# Patient Record
Sex: FEMALE | Race: WHITE | NOT HISPANIC OR LATINO
[De-identification: names, ages, dates, MRNs, and addresses within clinical notes are randomized per-mention and may not be internally consistent; named-entity substitution may affect disease eponyms.]

---

## 2017-01-12 ENCOUNTER — APPOINTMENT (OUTPATIENT)
Dept: CARDIOLOGY | Facility: CLINIC | Age: 57
End: 2017-01-12

## 2017-01-12 ENCOUNTER — NON-APPOINTMENT (OUTPATIENT)
Age: 57
End: 2017-01-12

## 2017-01-12 VITALS
OXYGEN SATURATION: 99 % | HEART RATE: 86 BPM | WEIGHT: 151 LBS | RESPIRATION RATE: 17 BRPM | BODY MASS INDEX: 25.16 KG/M2 | HEIGHT: 65 IN | DIASTOLIC BLOOD PRESSURE: 89 MMHG | SYSTOLIC BLOOD PRESSURE: 135 MMHG

## 2017-01-12 VITALS — DIASTOLIC BLOOD PRESSURE: 80 MMHG | HEART RATE: 72 BPM | SYSTOLIC BLOOD PRESSURE: 96 MMHG

## 2017-04-11 ENCOUNTER — APPOINTMENT (OUTPATIENT)
Dept: OBGYN | Facility: CLINIC | Age: 57
End: 2017-04-11

## 2017-04-11 VITALS
HEIGHT: 65 IN | WEIGHT: 146 LBS | SYSTOLIC BLOOD PRESSURE: 117 MMHG | HEART RATE: 77 BPM | OXYGEN SATURATION: 98 % | DIASTOLIC BLOOD PRESSURE: 72 MMHG | BODY MASS INDEX: 24.32 KG/M2

## 2017-04-13 ENCOUNTER — RX RENEWAL (OUTPATIENT)
Age: 57
End: 2017-04-13

## 2017-04-14 LAB — HPV HIGH+LOW RISK DNA PNL CVX: NEGATIVE

## 2017-04-20 LAB — CYTOLOGY CVX/VAG DOC THIN PREP: NORMAL

## 2017-04-28 ENCOUNTER — FORM ENCOUNTER (OUTPATIENT)
Age: 57
End: 2017-04-28

## 2017-04-29 ENCOUNTER — APPOINTMENT (OUTPATIENT)
Dept: RADIOLOGY | Facility: HOSPITAL | Age: 57
End: 2017-04-29

## 2017-04-29 ENCOUNTER — OUTPATIENT (OUTPATIENT)
Dept: OUTPATIENT SERVICES | Facility: HOSPITAL | Age: 57
LOS: 1 days | End: 2017-04-29
Payer: COMMERCIAL

## 2017-04-29 PROCEDURE — 77080 DXA BONE DENSITY AXIAL: CPT

## 2017-06-28 ENCOUNTER — APPOINTMENT (OUTPATIENT)
Dept: FAMILY MEDICINE | Facility: CLINIC | Age: 57
End: 2017-06-28

## 2017-07-13 ENCOUNTER — APPOINTMENT (OUTPATIENT)
Dept: FAMILY MEDICINE | Facility: CLINIC | Age: 57
End: 2017-07-13

## 2017-07-13 VITALS
DIASTOLIC BLOOD PRESSURE: 75 MMHG | BODY MASS INDEX: 25.33 KG/M2 | WEIGHT: 152 LBS | HEART RATE: 78 BPM | HEIGHT: 65 IN | RESPIRATION RATE: 20 BRPM | SYSTOLIC BLOOD PRESSURE: 118 MMHG

## 2017-07-17 ENCOUNTER — OTHER (OUTPATIENT)
Age: 57
End: 2017-07-17

## 2017-07-17 LAB
25(OH)D3 SERPL-MCNC: 36.4 NG/ML
ALBUMIN SERPL ELPH-MCNC: 4.6 G/DL
ALP BLD-CCNC: 61 U/L
ALT SERPL-CCNC: 13 U/L
ANION GAP SERPL CALC-SCNC: 18 MMOL/L
AST SERPL-CCNC: 16 U/L
BASOPHILS # BLD AUTO: 0.05 K/UL
BASOPHILS NFR BLD AUTO: 0.5 %
BILIRUB SERPL-MCNC: 0.2 MG/DL
BUN SERPL-MCNC: 18 MG/DL
CALCIUM SERPL-MCNC: 9.8 MG/DL
CHLORIDE SERPL-SCNC: 102 MMOL/L
CHOLEST SERPL-MCNC: 312 MG/DL
CHOLEST/HDLC SERPL: 5.5 RATIO
CO2 SERPL-SCNC: 20 MMOL/L
CREAT SERPL-MCNC: 0.85 MG/DL
EOSINOPHIL # BLD AUTO: 0.18 K/UL
EOSINOPHIL NFR BLD AUTO: 1.9 %
GLUCOSE SERPL-MCNC: 87 MG/DL
HBA1C MFR BLD HPLC: 5.2 %
HCT VFR BLD CALC: 41.7 %
HDLC SERPL-MCNC: 57 MG/DL
HGB BLD-MCNC: 13.3 G/DL
IMM GRANULOCYTES NFR BLD AUTO: 0.2 %
LDLC SERPL CALC-MCNC: 217 MG/DL
LYMPHOCYTES # BLD AUTO: 3.67 K/UL
LYMPHOCYTES NFR BLD AUTO: 38.1 %
MAN DIFF?: NORMAL
MCHC RBC-ENTMCNC: 30.2 PG
MCHC RBC-ENTMCNC: 31.9 GM/DL
MCV RBC AUTO: 94.8 FL
MONOCYTES # BLD AUTO: 0.75 K/UL
MONOCYTES NFR BLD AUTO: 7.8 %
NEUTROPHILS # BLD AUTO: 4.96 K/UL
NEUTROPHILS NFR BLD AUTO: 51.5 %
PLATELET # BLD AUTO: 317 K/UL
POTASSIUM SERPL-SCNC: 4.4 MMOL/L
PROT SERPL-MCNC: 7.2 G/DL
RBC # BLD: 4.4 M/UL
RBC # FLD: 13.7 %
SODIUM SERPL-SCNC: 140 MMOL/L
T4 FREE SERPL-MCNC: 1 NG/DL
TRIGL SERPL-MCNC: 191 MG/DL
TSH SERPL-ACNC: 4.21 UIU/ML
VIT B12 SERPL-MCNC: 349 PG/ML
WBC # FLD AUTO: 9.63 K/UL

## 2017-07-21 ENCOUNTER — APPOINTMENT (OUTPATIENT)
Dept: SURGERY | Facility: CLINIC | Age: 57
End: 2017-07-21

## 2017-07-21 VITALS — HEIGHT: 65 IN | WEIGHT: 152 LBS | BODY MASS INDEX: 25.33 KG/M2

## 2017-07-21 DIAGNOSIS — Z85.3 PERSONAL HISTORY OF MALIGNANT NEOPLASM OF BREAST: ICD-10-CM

## 2017-07-21 DIAGNOSIS — Z92.3 PERSONAL HISTORY OF IRRADIATION: ICD-10-CM

## 2017-07-21 DIAGNOSIS — Z12.11 ENCOUNTER FOR SCREENING FOR MALIGNANT NEOPLASM OF COLON: ICD-10-CM

## 2017-07-21 DIAGNOSIS — Z92.21 PERSONAL HISTORY OF ANTINEOPLASTIC CHEMOTHERAPY: ICD-10-CM

## 2017-07-21 DIAGNOSIS — Z87.42 PERSONAL HISTORY OF OTHER DISEASES OF THE FEMALE GENITAL TRACT: ICD-10-CM

## 2017-07-21 DIAGNOSIS — Z82.49 FAMILY HISTORY OF ISCHEMIC HEART DISEASE AND OTHER DISEASES OF THE CIRCULATORY SYSTEM: ICD-10-CM

## 2017-08-03 ENCOUNTER — RX RENEWAL (OUTPATIENT)
Age: 57
End: 2017-08-03

## 2017-08-15 ENCOUNTER — OUTPATIENT (OUTPATIENT)
Dept: OUTPATIENT SERVICES | Facility: HOSPITAL | Age: 57
LOS: 1 days | Discharge: HOME | End: 2017-08-15
Payer: COMMERCIAL

## 2017-08-15 ENCOUNTER — TRANSCRIPTION ENCOUNTER (OUTPATIENT)
Age: 57
End: 2017-08-15

## 2017-08-15 PROCEDURE — 45378 DIAGNOSTIC COLONOSCOPY: CPT | Mod: 33

## 2017-08-15 PROCEDURE — 45378 DIAGNOSTIC COLONOSCOPY: CPT

## 2017-08-29 ENCOUNTER — APPOINTMENT (OUTPATIENT)
Dept: FAMILY MEDICINE | Facility: CLINIC | Age: 57
End: 2017-08-29
Payer: COMMERCIAL

## 2017-08-29 VITALS
HEART RATE: 76 BPM | WEIGHT: 152 LBS | RESPIRATION RATE: 20 BRPM | BODY MASS INDEX: 25.33 KG/M2 | DIASTOLIC BLOOD PRESSURE: 70 MMHG | SYSTOLIC BLOOD PRESSURE: 122 MMHG | HEIGHT: 65 IN

## 2017-08-29 PROCEDURE — 99213 OFFICE O/P EST LOW 20 MIN: CPT | Mod: 25

## 2017-08-29 PROCEDURE — 36415 COLL VENOUS BLD VENIPUNCTURE: CPT

## 2017-09-02 LAB
ALBUMIN SERPL ELPH-MCNC: 5 G/DL
ALP BLD-CCNC: 58 U/L
ALT SERPL-CCNC: 19 U/L
AST SERPL-CCNC: 22 U/L
BILIRUB DIRECT SERPL-MCNC: 0.1 MG/DL
BILIRUB INDIRECT SERPL-MCNC: 0.2 MG/DL
BILIRUB SERPL-MCNC: 0.3 MG/DL
CHOLEST SERPL-MCNC: 251 MG/DL
CHOLEST/HDLC SERPL: 3.4 RATIO
HDLC SERPL-MCNC: 73 MG/DL
LDLC SERPL CALC-MCNC: 142 MG/DL
PROT SERPL-MCNC: 7.8 G/DL
TRIGL SERPL-MCNC: 178 MG/DL

## 2017-11-02 ENCOUNTER — RX RENEWAL (OUTPATIENT)
Age: 57
End: 2017-11-02

## 2017-12-04 ENCOUNTER — APPOINTMENT (OUTPATIENT)
Dept: FAMILY MEDICINE | Facility: CLINIC | Age: 57
End: 2017-12-04
Payer: COMMERCIAL

## 2017-12-04 VITALS
SYSTOLIC BLOOD PRESSURE: 124 MMHG | HEIGHT: 65 IN | WEIGHT: 158 LBS | BODY MASS INDEX: 26.33 KG/M2 | RESPIRATION RATE: 20 BRPM | HEART RATE: 78 BPM | DIASTOLIC BLOOD PRESSURE: 70 MMHG

## 2017-12-04 PROCEDURE — 99213 OFFICE O/P EST LOW 20 MIN: CPT | Mod: 25

## 2017-12-04 PROCEDURE — 36415 COLL VENOUS BLD VENIPUNCTURE: CPT

## 2017-12-12 LAB
ALBUMIN SERPL ELPH-MCNC: 4.9 G/DL
ALP BLD-CCNC: 57 U/L
ALT SERPL-CCNC: 16 U/L
ANION GAP SERPL CALC-SCNC: 13 MMOL/L
AST SERPL-CCNC: 18 U/L
BASOPHILS # BLD AUTO: 0.03 K/UL
BASOPHILS NFR BLD AUTO: 0.4 %
BILIRUB SERPL-MCNC: 0.2 MG/DL
BUN SERPL-MCNC: 17 MG/DL
CALCIUM SERPL-MCNC: 9.7 MG/DL
CHLORIDE SERPL-SCNC: 105 MMOL/L
CHOLEST SERPL-MCNC: 224 MG/DL
CHOLEST/HDLC SERPL: 3.3 RATIO
CO2 SERPL-SCNC: 25 MMOL/L
CREAT SERPL-MCNC: 0.74 MG/DL
EOSINOPHIL # BLD AUTO: 0.12 K/UL
EOSINOPHIL NFR BLD AUTO: 1.5 %
GLUCOSE SERPL-MCNC: 86 MG/DL
HBA1C MFR BLD HPLC: 5.2 %
HCT VFR BLD CALC: 41.8 %
HCV AB SER QL: NONREACTIVE
HCV S/CO RATIO: 0.07 S/CO
HDLC SERPL-MCNC: 68 MG/DL
HGB BLD-MCNC: 13.3 G/DL
IMM GRANULOCYTES NFR BLD AUTO: 0.1 %
LDLC SERPL CALC-MCNC: 123 MG/DL
LYMPHOCYTES # BLD AUTO: 3.64 K/UL
LYMPHOCYTES NFR BLD AUTO: 44 %
MAN DIFF?: NORMAL
MCHC RBC-ENTMCNC: 30.2 PG
MCHC RBC-ENTMCNC: 31.8 GM/DL
MCV RBC AUTO: 95 FL
MONOCYTES # BLD AUTO: 0.65 K/UL
MONOCYTES NFR BLD AUTO: 7.9 %
NEUTROPHILS # BLD AUTO: 3.82 K/UL
NEUTROPHILS NFR BLD AUTO: 46.1 %
PLATELET # BLD AUTO: 314 K/UL
POTASSIUM SERPL-SCNC: 4.8 MMOL/L
PROT SERPL-MCNC: 7.8 G/DL
RBC # BLD: 4.4 M/UL
RBC # FLD: 13.4 %
SODIUM SERPL-SCNC: 143 MMOL/L
TRIGL SERPL-MCNC: 163 MG/DL
WBC # FLD AUTO: 8.27 K/UL

## 2018-02-15 ENCOUNTER — RX RENEWAL (OUTPATIENT)
Age: 58
End: 2018-02-15

## 2018-02-28 ENCOUNTER — APPOINTMENT (OUTPATIENT)
Dept: FAMILY MEDICINE | Facility: CLINIC | Age: 58
End: 2018-02-28
Payer: COMMERCIAL

## 2018-02-28 VITALS
HEART RATE: 68 BPM | SYSTOLIC BLOOD PRESSURE: 124 MMHG | RESPIRATION RATE: 20 BRPM | DIASTOLIC BLOOD PRESSURE: 70 MMHG | TEMPERATURE: 98.1 F

## 2018-02-28 DIAGNOSIS — B02.9 ZOSTER W/OUT COMPLICATIONS: ICD-10-CM

## 2018-02-28 PROCEDURE — 99213 OFFICE O/P EST LOW 20 MIN: CPT

## 2018-02-28 RX ORDER — AZITHROMYCIN 250 MG/1
250 TABLET, FILM COATED ORAL
Qty: 6 | Refills: 0 | Status: DISCONTINUED | COMMUNITY
Start: 2017-12-23

## 2018-02-28 RX ORDER — FLUTICASONE PROPIONATE 50 UG/1
50 SPRAY, METERED NASAL
Qty: 16 | Refills: 0 | Status: DISCONTINUED | COMMUNITY
Start: 2017-12-23

## 2018-04-30 ENCOUNTER — APPOINTMENT (OUTPATIENT)
Dept: OBGYN | Facility: CLINIC | Age: 58
End: 2018-04-30
Payer: COMMERCIAL

## 2018-04-30 VITALS
SYSTOLIC BLOOD PRESSURE: 137 MMHG | DIASTOLIC BLOOD PRESSURE: 87 MMHG | HEIGHT: 65 IN | BODY MASS INDEX: 26.61 KG/M2 | HEART RATE: 67 BPM | OXYGEN SATURATION: 97 % | WEIGHT: 159.7 LBS

## 2018-04-30 DIAGNOSIS — N95.2 POSTMENOPAUSAL ATROPHIC VAGINITIS: ICD-10-CM

## 2018-04-30 PROCEDURE — 99396 PREV VISIT EST AGE 40-64: CPT

## 2018-04-30 RX ORDER — POLYETHYLENE GLYCOL 3350, SODIUM SULFATE, SODIUM CHLORIDE, POTASSIUM CHLORIDE, ASCORBIC ACID, SODIUM ASCORBATE 7.5-2.691G
100 KIT ORAL
Qty: 1 | Refills: 0 | Status: DISCONTINUED | COMMUNITY
Start: 2017-07-21 | End: 2018-04-30

## 2018-05-02 ENCOUNTER — FORM ENCOUNTER (OUTPATIENT)
Age: 58
End: 2018-05-02

## 2018-05-03 ENCOUNTER — APPOINTMENT (OUTPATIENT)
Dept: ULTRASOUND IMAGING | Facility: HOSPITAL | Age: 58
End: 2018-05-03
Payer: COMMERCIAL

## 2018-05-03 ENCOUNTER — OUTPATIENT (OUTPATIENT)
Dept: OUTPATIENT SERVICES | Facility: HOSPITAL | Age: 58
LOS: 1 days | End: 2018-05-03
Payer: COMMERCIAL

## 2018-05-03 DIAGNOSIS — Z15.02 GENETIC SUSCEPTIBILITY TO MALIGNANT NEOPLASM OF OVARY: ICD-10-CM

## 2018-05-03 LAB — HPV HIGH+LOW RISK DNA PNL CVX: NOT DETECTED

## 2018-05-03 PROCEDURE — 76830 TRANSVAGINAL US NON-OB: CPT | Mod: 26

## 2018-05-03 PROCEDURE — 76856 US EXAM PELVIC COMPLETE: CPT | Mod: 26

## 2018-05-03 PROCEDURE — 76830 TRANSVAGINAL US NON-OB: CPT

## 2018-05-03 PROCEDURE — 76856 US EXAM PELVIC COMPLETE: CPT

## 2018-05-06 LAB — CYTOLOGY CVX/VAG DOC THIN PREP: NORMAL

## 2018-05-16 ENCOUNTER — APPOINTMENT (OUTPATIENT)
Dept: FAMILY MEDICINE | Facility: CLINIC | Age: 58
End: 2018-05-16
Payer: COMMERCIAL

## 2018-05-16 VITALS
DIASTOLIC BLOOD PRESSURE: 80 MMHG | SYSTOLIC BLOOD PRESSURE: 128 MMHG | HEIGHT: 65 IN | WEIGHT: 160 LBS | RESPIRATION RATE: 20 BRPM | BODY MASS INDEX: 26.66 KG/M2 | HEART RATE: 68 BPM

## 2018-05-16 DIAGNOSIS — R14.0 ABDOMINAL DISTENSION (GASEOUS): ICD-10-CM

## 2018-05-16 PROCEDURE — 99213 OFFICE O/P EST LOW 20 MIN: CPT

## 2018-05-16 NOTE — PHYSICAL EXAM
[No Acute Distress] : no acute distress [No Respiratory Distress] : no respiratory distress  [Clear to Auscultation] : lungs were clear to auscultation bilaterally [No Accessory Muscle Use] : no accessory muscle use [Normal Rate] : normal rate  [Regular Rhythm] : with a regular rhythm [Normal S1, S2] : normal S1 and S2 [No Murmur] : no murmur heard [No Edema] : there was no peripheral edema [Soft] : abdomen soft [Non Tender] : non-tender [No Masses] : no abdominal mass palpated [No HSM] : no HSM [Normal Bowel Sounds] : normal bowel sounds [No Focal Deficits] : no focal deficits [Alert and Oriented x3] : oriented to person, place, and time [de-identified] : abd mildly distended and "firm"

## 2018-05-16 NOTE — ASSESSMENT
[FreeTextEntry1] : Abd bloating - abd "firm" but no evidence of acute abdomen on exam\par Lab profiles sent\par Consider CT

## 2018-05-16 NOTE — HISTORY OF PRESENT ILLNESS
[FreeTextEntry8] : Presents on acute basis - has had several weeks of abdominal "bloating" - states had been constipated but that has now resolved; denies vomiting; did see GYN recently - states exam was unremarkable and pelvic sono as well unremarkable (reviewed documentation).

## 2018-05-17 LAB
ALBUMIN SERPL ELPH-MCNC: 4.9 G/DL
ALP BLD-CCNC: 56 U/L
ALT SERPL-CCNC: 19 U/L
AMYLASE/CREAT SERPL: 111 U/L
ANION GAP SERPL CALC-SCNC: 16 MMOL/L
AST SERPL-CCNC: 19 U/L
BASOPHILS # BLD AUTO: 0.04 K/UL
BASOPHILS NFR BLD AUTO: 0.6 %
BILIRUB SERPL-MCNC: 0.4 MG/DL
BUN SERPL-MCNC: 14 MG/DL
CALCIUM SERPL-MCNC: 10.3 MG/DL
CHLORIDE SERPL-SCNC: 103 MMOL/L
CO2 SERPL-SCNC: 23 MMOL/L
CREAT SERPL-MCNC: 0.96 MG/DL
EOSINOPHIL # BLD AUTO: 0.2 K/UL
EOSINOPHIL NFR BLD AUTO: 3 %
GLUCOSE SERPL-MCNC: 95 MG/DL
HCT VFR BLD CALC: 42.1 %
HGB BLD-MCNC: 13.8 G/DL
IMM GRANULOCYTES NFR BLD AUTO: 0.3 %
LPL SERPL-CCNC: 29 U/L
LYMPHOCYTES # BLD AUTO: 2.79 K/UL
LYMPHOCYTES NFR BLD AUTO: 41.2 %
MAN DIFF?: NORMAL
MCHC RBC-ENTMCNC: 30.6 PG
MCHC RBC-ENTMCNC: 32.8 GM/DL
MCV RBC AUTO: 93.3 FL
MONOCYTES # BLD AUTO: 0.39 K/UL
MONOCYTES NFR BLD AUTO: 5.8 %
NEUTROPHILS # BLD AUTO: 3.33 K/UL
NEUTROPHILS NFR BLD AUTO: 49.1 %
PLATELET # BLD AUTO: 311 K/UL
POTASSIUM SERPL-SCNC: 4.9 MMOL/L
PROT SERPL-MCNC: 7.5 G/DL
RBC # BLD: 4.51 M/UL
RBC # FLD: 13.8 %
SODIUM SERPL-SCNC: 142 MMOL/L
T4 FREE SERPL-MCNC: 1.2 NG/DL
TSH SERPL-ACNC: 3.18 UIU/ML
WBC # FLD AUTO: 6.77 K/UL

## 2018-07-10 ENCOUNTER — RX RENEWAL (OUTPATIENT)
Age: 58
End: 2018-07-10

## 2018-08-27 ENCOUNTER — OUTPATIENT (OUTPATIENT)
Dept: OUTPATIENT SERVICES | Facility: HOSPITAL | Age: 58
LOS: 1 days | End: 2018-08-27
Payer: COMMERCIAL

## 2018-08-27 ENCOUNTER — APPOINTMENT (OUTPATIENT)
Dept: MRI IMAGING | Facility: HOSPITAL | Age: 58
End: 2018-08-27
Payer: COMMERCIAL

## 2018-08-27 DIAGNOSIS — Z00.8 ENCOUNTER FOR OTHER GENERAL EXAMINATION: ICD-10-CM

## 2018-08-27 PROCEDURE — 70551 MRI BRAIN STEM W/O DYE: CPT

## 2018-08-27 PROCEDURE — 70551 MRI BRAIN STEM W/O DYE: CPT | Mod: 26

## 2019-02-28 ENCOUNTER — TRANSCRIPTION ENCOUNTER (OUTPATIENT)
Age: 59
End: 2019-02-28

## 2019-03-22 ENCOUNTER — RX RENEWAL (OUTPATIENT)
Age: 59
End: 2019-03-22

## 2019-07-15 ENCOUNTER — TRANSCRIPTION ENCOUNTER (OUTPATIENT)
Age: 59
End: 2019-07-15

## 2019-10-22 ENCOUNTER — APPOINTMENT (OUTPATIENT)
Dept: OBGYN | Facility: CLINIC | Age: 59
End: 2019-10-22
Payer: COMMERCIAL

## 2019-10-22 VITALS
OXYGEN SATURATION: 98 % | WEIGHT: 154 LBS | HEIGHT: 65 IN | BODY MASS INDEX: 25.66 KG/M2 | DIASTOLIC BLOOD PRESSURE: 86 MMHG | HEART RATE: 85 BPM | SYSTOLIC BLOOD PRESSURE: 120 MMHG

## 2019-10-22 DIAGNOSIS — N95.1 MENOPAUSAL AND FEMALE CLIMACTERIC STATES: ICD-10-CM

## 2019-10-22 DIAGNOSIS — Z15.02 GENETIC SUSCEPTIBILITY TO MALIGNANT NEOPLASM OF OVARY: ICD-10-CM

## 2019-10-22 PROCEDURE — 99396 PREV VISIT EST AGE 40-64: CPT

## 2019-10-22 PROCEDURE — 99213 OFFICE O/P EST LOW 20 MIN: CPT | Mod: 25

## 2019-10-22 RX ORDER — VALACYCLOVIR 500 MG/1
500 TABLET, FILM COATED ORAL
Qty: 20 | Refills: 0 | Status: DISCONTINUED | COMMUNITY
Start: 2018-02-28 | End: 2019-10-22

## 2019-10-22 NOTE — HISTORY OF PRESENT ILLNESS
[Hot Flashes] : hot flashes [Night Sweats] : night sweats [Vaginal Itching] : vaginal itching [Dyspareunia] : dyspareunia [Mood Changes] : mood changes [Headache] : no headache [Fatigue] : no fatigue [Weight Change] : no weight change [Irritability] : no irritability [Forgetfulness] : no forgetfulness [Depression] : no depression [Loss of Libido] : loss of libido [Anxiety] : no anxiety [Normal Amount/Duration] : was of a normal amount and duration [Regular Cycle Intervals] : periods have been regular [Menstrual Cramps] : no menstrual cramps [Menarche Age: ____] : age at menarche was [unfilled] [Menopause Age: ____] : age at menopause was [unfilled] [Sexually Active] : is sexually active [Monogamous] : is monogamous [Male ___] : [unfilled] male

## 2019-10-22 NOTE — REVIEW OF SYSTEMS
[Chills] : no chills [Fever] : no fever [Feeling Tired] : not feeling tired [Recent Wt Gain ___ Lbs] : no recent weight gain [Pain] : no pain [Redness] : no redness [Sight Problems] : no sight problems [Discharge] : no discharge [Dec Hearing] : no hearing loss [Nosebleeds] : no nosebleeds [Sore Throat] : no sore throat [Nasal Discharge] : no nasal discharge [Heart Rate Is Fast] : the heart rate was not fast [Chest Pain] : no chest pain [Palpitations] : no palpitations [Lower Ext Edema] : no lower extremity edema [Dyspnea] : no shortness of breath [Wheezing] : no wheezing [Cough] : no cough [Abdominal Pain] : no abdominal pain [SOB on Exertion] : no shortness of breath during exertion [Vomiting] : no vomiting [Constipation] : no constipation [Diarrhea] : no diarrhea [Heartburn] : no heartburn [Melena] : no melena [Incontinence] : no incontinence [Dysuria] : no dysuria [Abn Vag Bleeding] : no abnormal vaginal bleeding [Pelvic Pain] : no pelvic pain [Frequency] : no frequency [Urgency] : no urgency [Arthralgias] : no arthralgias [Urethral Discharge] : no abnormal urethral discharge [Joint Pain] : no joint pain [Joint Stiffness] : no joint stiffness [Joint Swelling] : no joint swelling [Skin Lesions] : no skin lesions [Change In A Mole] : no change in a mole [Breast Pain] : no breast pain [Breast Lump] : no breast lump [Convulsions] : no convulsions [Dizziness] : no dizziness [Fainting] : no fainting [Headache] : no headache [Sleep Disturbances] : sleep disturbances [Anxiety] : no anxiety [Depression] : no depression [Muscle Weakness] : no muscle weakness [Hot Flashes] : hot flashes [Deepening Voice] : no deepening of the voice [Easy Bleeding] : does not bleed easily [Easy Bruising] : does not bruise easily [Swollen Glands In The Neck] : no swollen glands in the neck [Swollen Glands] : no swollen glands [Feeling Weak] : no feelings of weakness

## 2019-10-22 NOTE — PHYSICAL EXAM
[Awake] : awake [Alert] : alert [Acute Distress] : no acute distress [LAD] : no lymphadenopathy [Thyroid Nodule] : no thyroid nodule [Mass] : no breast mass [Goiter] : no goiter [Nipple Discharge] : no nipple discharge [Axillary LAD] : no axillary lymphadenopathy [Right Breast Absent] : a total mastectomy [Left Breast Absent] : a total mastectomy [No Masses] : no breast masses were palpable [Axillary Lymph Nodes Enlarged Bilaterally] : no enlarged nodes [Tender] : non tender [Distended] : not distended [H/Smegaly] : no hepatosplenomegaly [Oriented x3] : oriented to person, place, and time [Depressed Mood] : not depressed [Flat Affect] : affect not flat [No Lesions] : no genitalia lesions [Vulvitis] : no vulvitis [Vulvar Atrophy] : no vulvar atrophy [Labia Majora Erythema] : no erythema of the labia majora [Labia Minora Erythema] : no erythema of the labia minora [Labia Majora] : labia major [Labia Minora] : labia minora [Normal] : clitoris [Atrophy] : atrophy [Erythema] : no erythema [Cystocele] : no cystocele [Rectocele] : no rectocele [Dry Mucosa] : dry mucosa [Uterine Prolapse] : no uterine prolapse [No Bleeding] : there was no active vaginal bleeding [Discharge] : had no discharge [Erosion] : had no erosions [Pap Obtained] : a Pap smear was performed [Motion Tenderness] : there was no cervical motion tenderness [Soft] :  the cervix was soft [Normal Position] : in a normal position [Tenderness] : nontender [Enlarged ___ wks] : not enlarged [Mass ___ cm] : no uterine mass was palpated [Uterine Adnexae] : were not tender and not enlarged [Adnexa Tenderness] : were not tender [Ovarian Mass (___ Cm)] : there were no adnexal masses [FreeTextEntry9] : last colonoscopy 2016

## 2019-10-23 LAB — HPV HIGH+LOW RISK DNA PNL CVX: NOT DETECTED

## 2019-10-24 ENCOUNTER — FORM ENCOUNTER (OUTPATIENT)
Age: 59
End: 2019-10-24

## 2019-10-25 ENCOUNTER — OUTPATIENT (OUTPATIENT)
Dept: OUTPATIENT SERVICES | Facility: HOSPITAL | Age: 59
LOS: 1 days | End: 2019-10-25
Payer: COMMERCIAL

## 2019-10-25 ENCOUNTER — APPOINTMENT (OUTPATIENT)
Dept: ULTRASOUND IMAGING | Facility: HOSPITAL | Age: 59
End: 2019-10-25
Payer: COMMERCIAL

## 2019-10-25 DIAGNOSIS — Z15.02 GENETIC SUSCEPTIBILITY TO MALIGNANT NEOPLASM OF OVARY: ICD-10-CM

## 2019-10-25 PROCEDURE — 76856 US EXAM PELVIC COMPLETE: CPT | Mod: 26

## 2019-10-25 PROCEDURE — 76856 US EXAM PELVIC COMPLETE: CPT

## 2019-10-25 PROCEDURE — 76830 TRANSVAGINAL US NON-OB: CPT | Mod: 26

## 2019-10-25 PROCEDURE — 76830 TRANSVAGINAL US NON-OB: CPT

## 2019-10-27 LAB — CYTOLOGY CVX/VAG DOC THIN PREP: NORMAL

## 2019-10-29 ENCOUNTER — APPOINTMENT (OUTPATIENT)
Dept: FAMILY MEDICINE | Facility: CLINIC | Age: 59
End: 2019-10-29
Payer: COMMERCIAL

## 2019-10-29 VITALS
SYSTOLIC BLOOD PRESSURE: 114 MMHG | WEIGHT: 157 LBS | BODY MASS INDEX: 26.16 KG/M2 | HEIGHT: 65 IN | RESPIRATION RATE: 20 BRPM | DIASTOLIC BLOOD PRESSURE: 68 MMHG | HEART RATE: 68 BPM

## 2019-10-29 PROCEDURE — 99214 OFFICE O/P EST MOD 30 MIN: CPT | Mod: 25

## 2019-10-29 PROCEDURE — G0008: CPT

## 2019-10-29 PROCEDURE — 90686 IIV4 VACC NO PRSV 0.5 ML IM: CPT

## 2019-10-29 PROCEDURE — 36415 COLL VENOUS BLD VENIPUNCTURE: CPT

## 2019-10-29 NOTE — ASSESSMENT
[FreeTextEntry1] : Hemodynamically stable with well controlled BP\par Cardiac status stable with no murmur appreciated at this encounter\par Lab profiles sent\par Flu vaccine given L deltoid

## 2019-10-29 NOTE — HISTORY OF PRESENT ILLNESS
[de-identified] : Presents for BP check, labs, and general follow-up.  Also due for flu vaccine - pt in agreement.  States feeling generally well; now trying to walk daily and watch diet with goal of wt loss.

## 2019-10-30 LAB
25(OH)D3 SERPL-MCNC: 27.3 NG/ML
ALBUMIN SERPL ELPH-MCNC: 4.9 G/DL
ALP BLD-CCNC: 60 U/L
ALT SERPL-CCNC: 18 U/L
ANION GAP SERPL CALC-SCNC: 13 MMOL/L
AST SERPL-CCNC: 19 U/L
BASOPHILS # BLD AUTO: 0.05 K/UL
BASOPHILS NFR BLD AUTO: 0.6 %
BILIRUB SERPL-MCNC: 0.3 MG/DL
BUN SERPL-MCNC: 14 MG/DL
CALCIUM SERPL-MCNC: 9.9 MG/DL
CHLORIDE SERPL-SCNC: 102 MMOL/L
CHOLEST SERPL-MCNC: 208 MG/DL
CHOLEST/HDLC SERPL: 4.1 RATIO
CO2 SERPL-SCNC: 24 MMOL/L
CREAT SERPL-MCNC: 0.76 MG/DL
EOSINOPHIL # BLD AUTO: 0.14 K/UL
EOSINOPHIL NFR BLD AUTO: 1.8 %
ESTIMATED AVERAGE GLUCOSE: 100 MG/DL
FOLATE SERPL-MCNC: 12.6 NG/ML
GLUCOSE SERPL-MCNC: 92 MG/DL
HBA1C MFR BLD HPLC: 5.1 %
HCT VFR BLD CALC: 41.7 %
HDLC SERPL-MCNC: 51 MG/DL
HGB BLD-MCNC: 13.2 G/DL
IMM GRANULOCYTES NFR BLD AUTO: 0.3 %
LDLC SERPL CALC-MCNC: 122 MG/DL
LYMPHOCYTES # BLD AUTO: 3.24 K/UL
LYMPHOCYTES NFR BLD AUTO: 40.5 %
MAN DIFF?: NORMAL
MCHC RBC-ENTMCNC: 30.4 PG
MCHC RBC-ENTMCNC: 31.7 GM/DL
MCV RBC AUTO: 96.1 FL
MONOCYTES # BLD AUTO: 0.64 K/UL
MONOCYTES NFR BLD AUTO: 8 %
NEUTROPHILS # BLD AUTO: 3.91 K/UL
NEUTROPHILS NFR BLD AUTO: 48.8 %
PLATELET # BLD AUTO: 310 K/UL
POTASSIUM SERPL-SCNC: 4.7 MMOL/L
PROT SERPL-MCNC: 7.3 G/DL
RBC # BLD: 4.34 M/UL
RBC # FLD: 13.1 %
SODIUM SERPL-SCNC: 139 MMOL/L
T4 FREE SERPL-MCNC: 0.9 NG/DL
TRIGL SERPL-MCNC: 174 MG/DL
TSH SERPL-ACNC: 4.7 UIU/ML
VIT B12 SERPL-MCNC: 297 PG/ML
WBC # FLD AUTO: 8 K/UL

## 2020-01-17 ENCOUNTER — APPOINTMENT (OUTPATIENT)
Dept: FAMILY MEDICINE | Facility: CLINIC | Age: 60
End: 2020-01-17
Payer: COMMERCIAL

## 2020-01-17 VITALS
BODY MASS INDEX: 26.33 KG/M2 | WEIGHT: 158 LBS | DIASTOLIC BLOOD PRESSURE: 70 MMHG | HEIGHT: 65 IN | SYSTOLIC BLOOD PRESSURE: 115 MMHG | HEART RATE: 68 BPM | RESPIRATION RATE: 20 BRPM

## 2020-01-17 DIAGNOSIS — R01.1 CARDIAC MURMUR, UNSPECIFIED: ICD-10-CM

## 2020-01-17 PROCEDURE — 36415 COLL VENOUS BLD VENIPUNCTURE: CPT

## 2020-01-17 PROCEDURE — 99214 OFFICE O/P EST MOD 30 MIN: CPT | Mod: 25

## 2020-01-17 RX ORDER — TOBRAMYCIN AND DEXAMETHASONE 3; 1 MG/ML; MG/ML
0.3-0.1 SUSPENSION/ DROPS OPHTHALMIC
Qty: 5 | Refills: 0 | Status: DISCONTINUED | COMMUNITY
Start: 2019-12-06

## 2020-01-17 RX ORDER — CLARITHROMYCIN 250 MG/1
250 TABLET, FILM COATED ORAL
Qty: 28 | Refills: 0 | Status: DISCONTINUED | COMMUNITY
Start: 2019-07-30

## 2020-01-17 RX ORDER — AMOXICILLIN AND CLAVULANATE POTASSIUM 875; 125 MG/1; MG/1
875-125 TABLET, COATED ORAL
Qty: 14 | Refills: 0 | Status: DISCONTINUED | COMMUNITY
Start: 2019-07-18

## 2020-01-17 RX ORDER — LIDOCAINE HYDROCHLORIDE 20 MG/ML
2 SOLUTION ORAL; TOPICAL
Qty: 100 | Refills: 0 | Status: DISCONTINUED | COMMUNITY
Start: 2019-07-18

## 2020-01-17 NOTE — ASSESSMENT
[FreeTextEntry1] : Hemodynamically stable with acceptable BP\par Cardiac status stable; no murmur appreciated at this encounter\par Lab profiles drawn in office and sent

## 2020-01-17 NOTE — HISTORY OF PRESENT ILLNESS
[de-identified] : Presents for BP check, labs, and general follow-up.  States feeling well; trying to walk regularly.  Denies CP, SOB, palpitations.

## 2020-01-18 LAB
25(OH)D3 SERPL-MCNC: 35.2 NG/ML
ALBUMIN SERPL ELPH-MCNC: 4.9 G/DL
ALP BLD-CCNC: 54 U/L
ALT SERPL-CCNC: 17 U/L
ANION GAP SERPL CALC-SCNC: 17 MMOL/L
AST SERPL-CCNC: 20 U/L
BASOPHILS # BLD AUTO: 0.06 K/UL
BASOPHILS NFR BLD AUTO: 0.7 %
BILIRUB SERPL-MCNC: 0.4 MG/DL
BUN SERPL-MCNC: 20 MG/DL
CALCIUM SERPL-MCNC: 9.9 MG/DL
CHLORIDE SERPL-SCNC: 100 MMOL/L
CHOLEST SERPL-MCNC: 201 MG/DL
CHOLEST/HDLC SERPL: 3.1 RATIO
CO2 SERPL-SCNC: 22 MMOL/L
CREAT SERPL-MCNC: 1.11 MG/DL
EOSINOPHIL # BLD AUTO: 0.13 K/UL
EOSINOPHIL NFR BLD AUTO: 1.6 %
ESTIMATED AVERAGE GLUCOSE: 108 MG/DL
FOLATE SERPL-MCNC: 13.4 NG/ML
GLUCOSE SERPL-MCNC: 88 MG/DL
HBA1C MFR BLD HPLC: 5.4 %
HCT VFR BLD CALC: 42.2 %
HDLC SERPL-MCNC: 64 MG/DL
HGB BLD-MCNC: 13.4 G/DL
IMM GRANULOCYTES NFR BLD AUTO: 0.2 %
LDLC SERPL CALC-MCNC: 116 MG/DL
LYMPHOCYTES # BLD AUTO: 3.09 K/UL
LYMPHOCYTES NFR BLD AUTO: 38.5 %
MAN DIFF?: NORMAL
MCHC RBC-ENTMCNC: 29.9 PG
MCHC RBC-ENTMCNC: 31.8 GM/DL
MCV RBC AUTO: 94.2 FL
MONOCYTES # BLD AUTO: 0.64 K/UL
MONOCYTES NFR BLD AUTO: 8 %
NEUTROPHILS # BLD AUTO: 4.09 K/UL
NEUTROPHILS NFR BLD AUTO: 51 %
PLATELET # BLD AUTO: 303 K/UL
POTASSIUM SERPL-SCNC: 4.6 MMOL/L
PROT SERPL-MCNC: 7.3 G/DL
RBC # BLD: 4.48 M/UL
RBC # FLD: 13.3 %
SODIUM SERPL-SCNC: 139 MMOL/L
T4 FREE SERPL-MCNC: 1 NG/DL
TRIGL SERPL-MCNC: 106 MG/DL
TSH SERPL-ACNC: 3.57 UIU/ML
VIT B12 SERPL-MCNC: 410 PG/ML
WBC # FLD AUTO: 8.03 K/UL

## 2020-04-04 ENCOUNTER — TRANSCRIPTION ENCOUNTER (OUTPATIENT)
Age: 60
End: 2020-04-04

## 2020-09-28 ENCOUNTER — RX RENEWAL (OUTPATIENT)
Age: 60
End: 2020-09-28

## 2020-10-08 ENCOUNTER — EMERGENCY (EMERGENCY)
Facility: HOSPITAL | Age: 60
LOS: 1 days | Discharge: HOME | End: 2020-10-08
Attending: EMERGENCY MEDICINE | Admitting: EMERGENCY MEDICINE
Payer: COMMERCIAL

## 2020-10-08 ENCOUNTER — APPOINTMENT (OUTPATIENT)
Dept: FAMILY MEDICINE | Facility: CLINIC | Age: 60
End: 2020-10-08
Payer: COMMERCIAL

## 2020-10-08 VITALS
HEART RATE: 74 BPM | BODY MASS INDEX: 24.32 KG/M2 | RESPIRATION RATE: 16 BRPM | SYSTOLIC BLOOD PRESSURE: 126 MMHG | DIASTOLIC BLOOD PRESSURE: 86 MMHG | TEMPERATURE: 97.9 F | HEIGHT: 65 IN | WEIGHT: 146 LBS | OXYGEN SATURATION: 99 %

## 2020-10-08 VITALS
WEIGHT: 145.95 LBS | SYSTOLIC BLOOD PRESSURE: 162 MMHG | TEMPERATURE: 98 F | HEART RATE: 80 BPM | DIASTOLIC BLOOD PRESSURE: 109 MMHG | RESPIRATION RATE: 17 BRPM | HEIGHT: 65 IN | OXYGEN SATURATION: 98 %

## 2020-10-08 VITALS
DIASTOLIC BLOOD PRESSURE: 86 MMHG | HEART RATE: 72 BPM | RESPIRATION RATE: 18 BRPM | OXYGEN SATURATION: 100 % | SYSTOLIC BLOOD PRESSURE: 143 MMHG

## 2020-10-08 DIAGNOSIS — R10.9 UNSPECIFIED ABDOMINAL PAIN: ICD-10-CM

## 2020-10-08 DIAGNOSIS — K57.90 DIVERTICULOSIS OF INTESTINE, PART UNSPECIFIED, W/OUT PERFORATION OR ABSCESS W/OUT BLEEDING: ICD-10-CM

## 2020-10-08 DIAGNOSIS — K57.92 DIVERTICULITIS OF INTESTINE, PART UNSPECIFIED, W/OUT PERFORATION OR ABSCESS W/OUT BLEEDING: ICD-10-CM

## 2020-10-08 DIAGNOSIS — R10.32 LEFT LOWER QUADRANT PAIN: ICD-10-CM

## 2020-10-08 LAB
ALBUMIN SERPL ELPH-MCNC: 4.1 G/DL — SIGNIFICANT CHANGE UP (ref 3.3–5)
ALP SERPL-CCNC: 56 U/L — SIGNIFICANT CHANGE UP (ref 40–120)
ALT FLD-CCNC: 25 U/L — SIGNIFICANT CHANGE UP (ref 10–45)
ANION GAP SERPL CALC-SCNC: 3 MMOL/L — LOW (ref 5–17)
APPEARANCE UR: CLEAR — SIGNIFICANT CHANGE UP
AST SERPL-CCNC: 23 U/L — SIGNIFICANT CHANGE UP (ref 10–40)
BASOPHILS # BLD AUTO: 0.05 K/UL — SIGNIFICANT CHANGE UP (ref 0–0.2)
BASOPHILS NFR BLD AUTO: 0.6 % — SIGNIFICANT CHANGE UP (ref 0–2)
BILIRUB SERPL-MCNC: 0.6 MG/DL — SIGNIFICANT CHANGE UP (ref 0.2–1.2)
BILIRUB UR-MCNC: NEGATIVE — SIGNIFICANT CHANGE UP
BUN SERPL-MCNC: 20 MG/DL — SIGNIFICANT CHANGE UP (ref 7–23)
CALCIUM SERPL-MCNC: 9.5 MG/DL — SIGNIFICANT CHANGE UP (ref 8.4–10.5)
CHLORIDE SERPL-SCNC: 103 MMOL/L — SIGNIFICANT CHANGE UP (ref 96–108)
CO2 SERPL-SCNC: 30 MMOL/L — SIGNIFICANT CHANGE UP (ref 22–31)
COLOR SPEC: YELLOW — SIGNIFICANT CHANGE UP
CREAT SERPL-MCNC: 0.76 MG/DL — SIGNIFICANT CHANGE UP (ref 0.5–1.3)
DIFF PNL FLD: ABNORMAL
EOSINOPHIL # BLD AUTO: 0.11 K/UL — SIGNIFICANT CHANGE UP (ref 0–0.5)
EOSINOPHIL NFR BLD AUTO: 1.2 % — SIGNIFICANT CHANGE UP (ref 0–6)
EPI CELLS # UR: SIGNIFICANT CHANGE UP
GLUCOSE SERPL-MCNC: 98 MG/DL — SIGNIFICANT CHANGE UP (ref 70–99)
GLUCOSE UR QL: NEGATIVE — SIGNIFICANT CHANGE UP
HCT VFR BLD CALC: 42.5 % — SIGNIFICANT CHANGE UP (ref 34.5–45)
HGB BLD-MCNC: 13.7 G/DL — SIGNIFICANT CHANGE UP (ref 11.5–15.5)
IMM GRANULOCYTES NFR BLD AUTO: 0.4 % — SIGNIFICANT CHANGE UP (ref 0–1.5)
KETONES UR-MCNC: NEGATIVE — SIGNIFICANT CHANGE UP
LEUKOCYTE ESTERASE UR-ACNC: ABNORMAL
LIDOCAIN IGE QN: 94 U/L — SIGNIFICANT CHANGE UP (ref 73–393)
LYMPHOCYTES # BLD AUTO: 2.83 K/UL — SIGNIFICANT CHANGE UP (ref 1–3.3)
LYMPHOCYTES # BLD AUTO: 31.2 % — SIGNIFICANT CHANGE UP (ref 13–44)
MCHC RBC-ENTMCNC: 30.9 PG — SIGNIFICANT CHANGE UP (ref 27–34)
MCHC RBC-ENTMCNC: 32.2 GM/DL — SIGNIFICANT CHANGE UP (ref 32–36)
MCV RBC AUTO: 95.9 FL — SIGNIFICANT CHANGE UP (ref 80–100)
MONOCYTES # BLD AUTO: 0.55 K/UL — SIGNIFICANT CHANGE UP (ref 0–0.9)
MONOCYTES NFR BLD AUTO: 6.1 % — SIGNIFICANT CHANGE UP (ref 2–14)
NEUTROPHILS # BLD AUTO: 5.48 K/UL — SIGNIFICANT CHANGE UP (ref 1.8–7.4)
NEUTROPHILS NFR BLD AUTO: 60.5 % — SIGNIFICANT CHANGE UP (ref 43–77)
NITRITE UR-MCNC: NEGATIVE — SIGNIFICANT CHANGE UP
NRBC # BLD: 0 /100 WBCS — SIGNIFICANT CHANGE UP (ref 0–0)
PH UR: 6 — SIGNIFICANT CHANGE UP (ref 5–8)
PLATELET # BLD AUTO: 295 K/UL — SIGNIFICANT CHANGE UP (ref 150–400)
POTASSIUM SERPL-MCNC: 4.8 MMOL/L — SIGNIFICANT CHANGE UP (ref 3.5–5.3)
POTASSIUM SERPL-SCNC: 4.8 MMOL/L — SIGNIFICANT CHANGE UP (ref 3.5–5.3)
PROT SERPL-MCNC: 7.8 G/DL — SIGNIFICANT CHANGE UP (ref 6–8.3)
PROT UR-MCNC: NEGATIVE — SIGNIFICANT CHANGE UP
RBC # BLD: 4.43 M/UL — SIGNIFICANT CHANGE UP (ref 3.8–5.2)
RBC # FLD: 12.9 % — SIGNIFICANT CHANGE UP (ref 10.3–14.5)
RBC CASTS # UR COMP ASSIST: SIGNIFICANT CHANGE UP /HPF (ref 0–4)
SODIUM SERPL-SCNC: 136 MMOL/L — SIGNIFICANT CHANGE UP (ref 135–145)
SP GR SPEC: 1.01 — SIGNIFICANT CHANGE UP (ref 1.01–1.02)
UROBILINOGEN FLD QL: NEGATIVE — SIGNIFICANT CHANGE UP
WBC # BLD: 9.06 K/UL — SIGNIFICANT CHANGE UP (ref 3.8–10.5)
WBC # FLD AUTO: 9.06 K/UL — SIGNIFICANT CHANGE UP (ref 3.8–10.5)
WBC UR QL: SIGNIFICANT CHANGE UP /HPF (ref 0–5)

## 2020-10-08 PROCEDURE — 74177 CT ABD & PELVIS W/CONTRAST: CPT

## 2020-10-08 PROCEDURE — 81001 URINALYSIS AUTO W/SCOPE: CPT

## 2020-10-08 PROCEDURE — 99215 OFFICE O/P EST HI 40 MIN: CPT

## 2020-10-08 PROCEDURE — 83690 ASSAY OF LIPASE: CPT

## 2020-10-08 PROCEDURE — 36415 COLL VENOUS BLD VENIPUNCTURE: CPT

## 2020-10-08 PROCEDURE — 87086 URINE CULTURE/COLONY COUNT: CPT

## 2020-10-08 PROCEDURE — 99285 EMERGENCY DEPT VISIT HI MDM: CPT

## 2020-10-08 PROCEDURE — 96360 HYDRATION IV INFUSION INIT: CPT | Mod: XU

## 2020-10-08 PROCEDURE — 80053 COMPREHEN METABOLIC PANEL: CPT

## 2020-10-08 PROCEDURE — 74177 CT ABD & PELVIS W/CONTRAST: CPT | Mod: 26

## 2020-10-08 PROCEDURE — 99284 EMERGENCY DEPT VISIT MOD MDM: CPT | Mod: 25

## 2020-10-08 PROCEDURE — 85025 COMPLETE CBC W/AUTO DIFF WBC: CPT

## 2020-10-08 RX ORDER — SODIUM CHLORIDE 9 MG/ML
1000 INJECTION INTRAMUSCULAR; INTRAVENOUS; SUBCUTANEOUS ONCE
Refills: 0 | Status: COMPLETED | OUTPATIENT
Start: 2020-10-08 | End: 2020-10-08

## 2020-10-08 RX ORDER — ESCITALOPRAM OXALATE 10 MG/1
10 TABLET ORAL DAILY
Qty: 1 | Refills: 3 | Status: COMPLETED | COMMUNITY
Start: 2019-10-22 | End: 2020-10-08

## 2020-10-08 RX ORDER — MORPHINE SULFATE 50 MG/1
4 CAPSULE, EXTENDED RELEASE ORAL ONCE
Refills: 0 | Status: DISCONTINUED | OUTPATIENT
Start: 2020-10-08 | End: 2020-10-08

## 2020-10-08 RX ORDER — ONDANSETRON 8 MG/1
4 TABLET, FILM COATED ORAL ONCE
Refills: 0 | Status: COMPLETED | OUTPATIENT
Start: 2020-10-08 | End: 2020-10-08

## 2020-10-08 RX ADMIN — SODIUM CHLORIDE 1000 MILLILITER(S): 9 INJECTION INTRAMUSCULAR; INTRAVENOUS; SUBCUTANEOUS at 12:32

## 2020-10-08 RX ADMIN — SODIUM CHLORIDE 1000 MILLILITER(S): 9 INJECTION INTRAMUSCULAR; INTRAVENOUS; SUBCUTANEOUS at 11:32

## 2020-10-08 NOTE — ED ADULT NURSE NOTE - OBJECTIVE STATEMENT
Pt a&ox3 ambulatory to ED sent by PCP for evaluatiuon for LLQ abdominal pain x2 days. Pt denies n/v/d. Had one small BM yesterday, states she feels constipated. No fever/chills.

## 2020-10-08 NOTE — ED PROVIDER NOTE - PROGRESS NOTE DETAILS
pt refused morphine and doesn't want to try any other pain medication. she is resting comfortably in bed reading. will let us know if anything changes and she needs pain medication

## 2020-10-08 NOTE — HEALTH RISK ASSESSMENT
[] : No [Yes] : Yes [Monthly or less (1 pt)] : Monthly or less (1 point) [1 or 2 (0 pts)] : 1 or 2 (0 points) [Never (0 pts)] : Never (0 points) [No] : In the past 12 months have you used drugs other than those required for medical reasons? No [Audit-CScore] : 0 [de-identified] : walking  [de-identified] : healthy

## 2020-10-08 NOTE — HISTORY OF PRESENT ILLNESS
[FreeTextEntry8] : cc: acute abd pain \par \par Patient presented with acute abd pain few days, first on the RLQ  now on the LLQ , tender ,no fever, no chills, no N,no V, + constipation. Patient denies CP,SOB. last [place] 08/2017 Diverticulosis.

## 2020-10-08 NOTE — PHYSICAL EXAM
[No Varicosities] : no varicosities [No Edema] : there was no peripheral edema [Alert and Oriented x3] : oriented to person, place, and time [Normal] : affect was normal and insight and judgment were intact [de-identified] : + rebound and gourding LLQ, + decreased BS, tender

## 2020-10-08 NOTE — ED ADULT NURSE NOTE - CHPI ED NUR SYMPTOMS NEG
no diarrhea/no vomiting/no fever/no abdominal distension/no blood in stool/no burning urination/no chills/no dysuria/no hematuria/no nausea

## 2020-10-08 NOTE — ED PROVIDER NOTE - NSFOLLOWUPINSTRUCTIONS_ED_ALL_ED_FT
Epiploic Appendagitis       Epiploic appendagitis (EA) is swelling and irritation of pouches (epiploic appendages) that are attached to the end portion of the large intestine (colon). These pouches contain fat and are attached to the outside of the colon. This condition causes sudden pain in the lower abdomen.    EA is rare, and it usually goes away on its own. It can feel like other abdomen (abdominal) conditions, such as appendicitis, a gallbladder attack (cholecystitis), or diverticulitis.      What are the causes?  This condition may be caused by:  •Blocked blood flow due to a blood clot.      •Twisting (torsion) of the epiploic appendages.      •Conditions that cause swelling and inflammation of nearby tissue, such as long-term (chronic) diarrhea, Crohn disease, or ulcerative colitis.        What increases the risk?  You are more likely to develop this condition if:  •You are 40–50 years old.      •You are male.      •You are overweight.        What are the signs or symptoms?    The most common symptom of this condition is lower abdominal pain that starts suddenly and can be severe. Pain can be anywhere in the lower abdomen, but it is more common on the left side. The pain may get worse with movement or when pressing on your abdomen.  The following symptoms are possible, but they are not common:  •Fever.      •Nausea.      •Diarrhea or constipation.        How is this diagnosed?  Your health care provider may suspect EA if you have sudden lower abdominal pain without other symptoms. The condition may be diagnosed based on:  •CT scan. This is the best way to diagnose EA.      •Your symptoms and a physical exam.      •Ultrasound.      •A blood test (complete blood count, CBC).      •A procedure to look inside your abdomen using a lighted scope that has a tiny camera on the end (laparoscopy). This may be done to confirm the diagnosis.        How is this treated?    EA usually goes away without treatment. Your health care provider may recommend NSAIDs (such as aspirin or ibuprofen) to reduce pain and inflammation. In rare cases, if the condition does not improve or it keeps coming back, you may need surgery to remove the appendages.      Follow these instructions at home:    •Take over-the-counter and prescription medicines only as told by your health care provider.      •Return to your normal activities as told by your health care provider. Ask your health care provider what activities are safe for you.      •Keep all follow-up visits as told by your health care provider. This is important.        Contact a health care provider if:    •You have a fever.      •You develop nausea, vomiting, diarrhea, or constipation.      •Your pain gets worse.      •Your pain lasts longer than 10 days.        Get help right away if:    •You have severe pain that does not get better after you take medicine.        Summary    •Epiploic appendagitis (EA) is swelling and irritation of pouches (epiploic appendages) that are attached to the outside of the colon. The colon is the end portion of the large intestine.      •EA can feel like other abdominal conditions, such as appendicitis, a gallbladder attack (cholecystitis), or diverticulitis.      •EA usually goes away without treatment. Your health care provider may recommend NSAIDs (such as aspirin or ibuprofen) to reduce pain and inflammation.      This information is not intended to replace advice given to you by your health care provider. Make sure you discuss any questions you have with your health care provider.

## 2020-10-08 NOTE — ED PROVIDER NOTE - ATTENDING CONTRIBUTION TO CARE
Dr. Bell: I performed a face to face bedside interview with patient regarding history of present illness, review of symptoms and past medical history. I completed an independent physical exam.  I have discussed patient's plan of care with PA.   I agree with note as stated above, having amended the EMR as needed to reflect my findings.   This includes HISTORY OF PRESENT ILLNESS, HIV, PAST MEDICAL/SURGICAL/FAMILY/SOCIAL HISTORY, ALLERGIES AND HOME MEDICATIONS, REVIEW OF SYSTEMS, PHYSICAL EXAM, and any PROGRESS NOTES during the time I functioned as the attending physician for this patient.    Dr. Bell: This H&P has been written by myself in its entirety

## 2020-10-08 NOTE — ASSESSMENT
[FreeTextEntry1] : Patient referred to ER from the office for further eval - CT /BW.Case d/w ED  TEAGAN OLIVARES.\par

## 2020-10-08 NOTE — ED PROVIDER NOTE - PATIENT PORTAL LINK FT
You can access the FollowMyHealth Patient Portal offered by [facility] by registering at the following website: [email or web address]. By joining [facility]’s FollowMyHealth portal, you will also be able to view your health information using other applications (apps) compatible with our system.

## 2020-10-08 NOTE — ED PROVIDER NOTE - CLINICAL SUMMARY MEDICAL DECISION MAKING FREE TEXT BOX
Pt with LLQ pain, will get labs and CT r/o diverticulitis. Pt is very well appearing. Pt with LLQ pain, will get labs and CT r/o diverticulitis. Pt is very well appearing.  On reassessment pt doing well, CT showing epilploic appendagitis, explained to pt and pt to be dc'ed home on NSAIDs.

## 2020-10-08 NOTE — REVIEW OF SYSTEMS
[Abdominal Pain] : abdominal pain [Nausea] : no nausea [Constipation] : constipation [Diarrhea] : diarrhea [Vomiting] : no vomiting [Heartburn] : no heartburn [Melena] : no melena [Negative] : Psychiatric

## 2020-10-08 NOTE — ED ADULT NURSE NOTE - PSH
S/P Breast Lumpectomy  right side 11/08, reexcision, 12/08 right sentinal node biopsy  left side 11/09  S/P Ovarian Cystectomy  laparoscopic, in the 1990's  S/P Tonsillectomy  age [age]

## 2020-10-08 NOTE — ED ADULT NURSE REASSESSMENT NOTE - NS ED NURSE REASSESS COMMENT FT1
Pt in no apparent distress at this time. Awaiting CT scan, pt aware as to plan of care with no questions. Call bell within reach, will continue to monitor.

## 2020-10-08 NOTE — ED PROVIDER NOTE - OBJECTIVE STATEMENT
Dr. Bell: 60F h/o bilateral mastectomy with abdominal flap in the past for breast cancer (not metastatic), sent in by Dr. Camelia RUBIO to r/o diverticulitis. Pt has a h/o diverticulosis, p/w 2-3 days of LLQ pain. No nausea, vomiting, fevers, chills, dysuria, hematuria, rectal bleeding, constipation or diarrhea. Never had pain like this before. No travel or sick contacts.

## 2020-10-09 LAB
CULTURE RESULTS: SIGNIFICANT CHANGE UP
SPECIMEN SOURCE: SIGNIFICANT CHANGE UP

## 2020-12-15 PROBLEM — Z12.11 ENCOUNTER FOR SCREENING COLONOSCOPY: Status: RESOLVED | Noted: 2017-07-21 | Resolved: 2020-12-15

## 2021-01-11 ENCOUNTER — RX RENEWAL (OUTPATIENT)
Age: 61
End: 2021-01-11

## 2021-06-21 PROBLEM — I10 ESSENTIAL (PRIMARY) HYPERTENSION: Chronic | Status: ACTIVE | Noted: 2020-10-08

## 2021-06-21 PROBLEM — E78.00 PURE HYPERCHOLESTEROLEMIA, UNSPECIFIED: Chronic | Status: ACTIVE | Noted: 2020-10-08

## 2021-07-08 ENCOUNTER — NON-APPOINTMENT (OUTPATIENT)
Age: 61
End: 2021-07-08

## 2021-07-08 ENCOUNTER — APPOINTMENT (OUTPATIENT)
Dept: OBGYN | Facility: CLINIC | Age: 61
End: 2021-07-08
Payer: COMMERCIAL

## 2021-07-08 VITALS
WEIGHT: 150 LBS | OXYGEN SATURATION: 98 % | HEIGHT: 65 IN | TEMPERATURE: 97.1 F | DIASTOLIC BLOOD PRESSURE: 70 MMHG | BODY MASS INDEX: 24.99 KG/M2 | SYSTOLIC BLOOD PRESSURE: 100 MMHG | HEART RATE: 74 BPM

## 2021-07-08 DIAGNOSIS — Z01.419 ENCOUNTER FOR GYNECOLOGICAL EXAMINATION (GENERAL) (ROUTINE) W/OUT ABNORMAL FINDINGS: ICD-10-CM

## 2021-07-08 PROCEDURE — 99396 PREV VISIT EST AGE 40-64: CPT

## 2021-07-08 PROCEDURE — 99072 ADDL SUPL MATRL&STAF TM PHE: CPT

## 2021-07-08 NOTE — HISTORY OF PRESENT ILLNESS
[FreeTextEntry1] : Chek up  Without complaint  Well since last visit  Denies COVID infection S/P COVID vaccine [postmenopausal] : postmenopausal [Y] : Patient is sexually active [Monogamous (Male Partner)] : is monogamous with a male partner [LMPDate] : age 51 [PGHxABInduced] : 1

## 2021-07-19 LAB
CYTOLOGY CVX/VAG DOC THIN PREP: NORMAL
HPV HIGH+LOW RISK DNA PNL CVX: NOT DETECTED

## 2021-08-03 ENCOUNTER — APPOINTMENT (OUTPATIENT)
Dept: ULTRASOUND IMAGING | Facility: HOSPITAL | Age: 61
End: 2021-08-03
Payer: COMMERCIAL

## 2021-08-03 ENCOUNTER — OUTPATIENT (OUTPATIENT)
Dept: OUTPATIENT SERVICES | Facility: HOSPITAL | Age: 61
LOS: 1 days | End: 2021-08-03
Payer: COMMERCIAL

## 2021-08-03 DIAGNOSIS — Z15.02 GENETIC SUSCEPTIBILITY TO MALIGNANT NEOPLASM OF OVARY: ICD-10-CM

## 2021-08-03 PROCEDURE — 76830 TRANSVAGINAL US NON-OB: CPT

## 2021-08-03 PROCEDURE — 76856 US EXAM PELVIC COMPLETE: CPT | Mod: 26

## 2021-08-03 PROCEDURE — 76830 TRANSVAGINAL US NON-OB: CPT | Mod: 26

## 2021-08-03 PROCEDURE — 76856 US EXAM PELVIC COMPLETE: CPT

## 2021-09-23 ENCOUNTER — RX RENEWAL (OUTPATIENT)
Age: 61
End: 2021-09-23

## 2022-02-01 ENCOUNTER — RX RENEWAL (OUTPATIENT)
Age: 62
End: 2022-02-01

## 2022-02-22 ENCOUNTER — APPOINTMENT (OUTPATIENT)
Dept: FAMILY MEDICINE | Facility: CLINIC | Age: 62
End: 2022-02-22
Payer: COMMERCIAL

## 2022-02-22 ENCOUNTER — LABORATORY RESULT (OUTPATIENT)
Age: 62
End: 2022-02-22

## 2022-02-22 VITALS
HEART RATE: 68 BPM | DIASTOLIC BLOOD PRESSURE: 75 MMHG | BODY MASS INDEX: 25.99 KG/M2 | HEIGHT: 65 IN | SYSTOLIC BLOOD PRESSURE: 130 MMHG | WEIGHT: 156 LBS | RESPIRATION RATE: 20 BRPM

## 2022-02-22 DIAGNOSIS — Z00.00 ENCOUNTER FOR GENERAL ADULT MEDICAL EXAMINATION W/OUT ABNORMAL FINDINGS: ICD-10-CM

## 2022-02-22 PROCEDURE — 99214 OFFICE O/P EST MOD 30 MIN: CPT | Mod: 25

## 2022-02-22 PROCEDURE — 36415 COLL VENOUS BLD VENIPUNCTURE: CPT

## 2022-02-22 NOTE — HISTORY OF PRESENT ILLNESS
[de-identified] : Presents for BP check, labs, and general follow-up.  States feeling generally well; does acknowledge a wt gain.

## 2022-02-23 LAB
25(OH)D3 SERPL-MCNC: 27.7 NG/ML
ALBUMIN SERPL ELPH-MCNC: 4.8 G/DL
ALP BLD-CCNC: 59 U/L
ALT SERPL-CCNC: 20 U/L
ANION GAP SERPL CALC-SCNC: 14 MMOL/L
APPEARANCE: CLEAR
AST SERPL-CCNC: 19 U/L
BASOPHILS # BLD AUTO: 0.05 K/UL
BASOPHILS NFR BLD AUTO: 0.8 %
BILIRUB SERPL-MCNC: 0.5 MG/DL
BILIRUBIN URINE: NEGATIVE
BLOOD URINE: NEGATIVE
BUN SERPL-MCNC: 12 MG/DL
CALCIUM SERPL-MCNC: 9.7 MG/DL
CHLORIDE SERPL-SCNC: 107 MMOL/L
CHOLEST SERPL-MCNC: 238 MG/DL
CO2 SERPL-SCNC: 22 MMOL/L
COLOR: YELLOW
CREAT SERPL-MCNC: 0.73 MG/DL
EOSINOPHIL # BLD AUTO: 0.12 K/UL
EOSINOPHIL NFR BLD AUTO: 1.8 %
FOLATE SERPL-MCNC: 11.7 NG/ML
GLUCOSE QUALITATIVE U: NEGATIVE
GLUCOSE SERPL-MCNC: 92 MG/DL
HCT VFR BLD CALC: 43.9 %
HDLC SERPL-MCNC: 64 MG/DL
HGB BLD-MCNC: 13.9 G/DL
IMM GRANULOCYTES NFR BLD AUTO: 0.3 %
KETONES URINE: NEGATIVE
LDLC SERPL CALC-MCNC: 149 MG/DL
LEUKOCYTE ESTERASE URINE: ABNORMAL
LYMPHOCYTES # BLD AUTO: 2.82 K/UL
LYMPHOCYTES NFR BLD AUTO: 42.7 %
MAN DIFF?: NORMAL
MCHC RBC-ENTMCNC: 30.1 PG
MCHC RBC-ENTMCNC: 31.7 GM/DL
MCV RBC AUTO: 95 FL
MONOCYTES # BLD AUTO: 0.55 K/UL
MONOCYTES NFR BLD AUTO: 8.3 %
NEUTROPHILS # BLD AUTO: 3.05 K/UL
NEUTROPHILS NFR BLD AUTO: 46.1 %
NITRITE URINE: NEGATIVE
NONHDLC SERPL-MCNC: 174 MG/DL
PH URINE: 6
PLATELET # BLD AUTO: 303 K/UL
POTASSIUM SERPL-SCNC: 4.3 MMOL/L
PROT SERPL-MCNC: 7.1 G/DL
PROTEIN URINE: NEGATIVE
RBC # BLD: 4.62 M/UL
RBC # FLD: 13.3 %
SODIUM SERPL-SCNC: 143 MMOL/L
SPECIFIC GRAVITY URINE: 1.02
T4 FREE SERPL-MCNC: 1.1 NG/DL
TRIGL SERPL-MCNC: 125 MG/DL
TSH SERPL-ACNC: 2.81 UIU/ML
UROBILINOGEN URINE: NORMAL
VIT B12 SERPL-MCNC: 367 PG/ML
WBC # FLD AUTO: 6.61 K/UL

## 2022-05-01 ENCOUNTER — RX RENEWAL (OUTPATIENT)
Age: 62
End: 2022-05-01

## 2022-05-06 ENCOUNTER — NON-APPOINTMENT (OUTPATIENT)
Age: 62
End: 2022-05-06

## 2022-06-02 ENCOUNTER — NON-APPOINTMENT (OUTPATIENT)
Age: 62
End: 2022-06-02

## 2022-06-02 ENCOUNTER — APPOINTMENT (OUTPATIENT)
Dept: CARDIOLOGY | Facility: CLINIC | Age: 62
End: 2022-06-02
Payer: COMMERCIAL

## 2022-06-02 VITALS
OXYGEN SATURATION: 99 % | HEIGHT: 65 IN | RESPIRATION RATE: 19 BRPM | HEART RATE: 74 BPM | TEMPERATURE: 97.6 F | WEIGHT: 156 LBS | BODY MASS INDEX: 25.99 KG/M2

## 2022-06-02 VITALS — SYSTOLIC BLOOD PRESSURE: 150 MMHG | DIASTOLIC BLOOD PRESSURE: 92 MMHG | HEART RATE: 64 BPM

## 2022-06-02 DIAGNOSIS — I71.4 ABDOMINAL AORTIC ANEURYSM, W/OUT RUPTURE: ICD-10-CM

## 2022-06-02 PROCEDURE — 93000 ELECTROCARDIOGRAM COMPLETE: CPT

## 2022-06-02 PROCEDURE — 99204 OFFICE O/P NEW MOD 45 MIN: CPT

## 2022-06-02 NOTE — REASON FOR VISIT
[Other: ____] : [unfilled] [FreeTextEntry1] : This is a 62-year-old woman who presents for cardiac reassessment. The patient is felt that at age 62 it was time. In the remote past she has had a normal echocardiogram and normal stress test. In the last 5 years she has developed hypertension. She does have a history of hyperlipidemia and is currently on increased dose of Zocor to 40 mg per day. Her most recent lipid profile revealed total cholesterol 238 HDL 64 . She has no history of diabetes. She is a nonsmoker. And no significant family history of heart disease. The patient has several siblings one has occasional palpitations that she states can last for several minutes but she believes they are usually at times of emotional stress. She also has occasional burning in her neck but states that this usually occurs after eating spicy foods. She does know to recent onset of leg swelling which she states occurs more commonly when she is on long car rides or plane rides and her legs are usually normal in the morning

## 2022-06-02 NOTE — PHYSICAL EXAM
[General Appearance - Well Developed] : well developed [Normal Appearance] : normal appearance [Well Groomed] : well groomed [General Appearance - Well Nourished] : well nourished [No Deformities] : no deformities [General Appearance - In No Acute Distress] : no acute distress [Respiration, Rhythm And Depth] : normal respiratory rhythm and effort [Exaggerated Use Of Accessory Muscles For Inspiration] : no accessory muscle use [Auscultation Breath Sounds / Voice Sounds] : lungs were clear to auscultation bilaterally [Abdomen Soft] : soft [Abdomen Tenderness] : non-tender [Abdomen Mass (___ Cm)] : no abdominal mass palpated [Abnormal Walk] : normal gait [Gait - Sufficient For Exercise Testing] : the gait was sufficient for exercise testing [Nail Clubbing] : no clubbing of the fingernails [Cyanosis, Localized] : no localized cyanosis [Petechial Hemorrhages (___cm)] : no petechial hemorrhages [Skin Color & Pigmentation] : normal skin color and pigmentation [] : no rash [No Venous Stasis] : no venous stasis [Skin Lesions] : no skin lesions [No Skin Ulcers] : no skin ulcer [No Xanthoma] : no  xanthoma was observed [Oriented To Time, Place, And Person] : oriented to person, place, and time [Affect] : the affect was normal [Mood] : the mood was normal [No Anxiety] : not feeling anxious [5th Left ICS - MCL] : palpated at the 5th LICS in the midclavicular line [Normal] : normal [Normal Rate] : normal [Rhythm Regular] : regular [I] : a grade 1 [Left Carotid Bruit] : no bruit heard over the left carotid [Right Carotid Bruit] : no bruit heard over the right carotid [Right Femoral Bruit] : no bruit heard over the right femoral artery [Left Femoral Bruit] : no bruit heard over the left femoral artery [No Abnormalities] : the abdominal aorta was not enlarged and no bruit was heard [No Pitting Edema] : no pitting edema present [Rt] : no varicose veins of the right leg [Lt] : no varicose veins of the left leg

## 2022-06-02 NOTE — ASSESSMENT
[FreeTextEntry1] : In summary, the patient is a 62-year-old woman with hypertension that  is generally well controlled although she has an elevated reading today. She also has hyperlipidemia and recent onset leg edema. Her symptoms of palpitations and neck burning appear to be stress and GI related.\par \par She will undergo echocardiography to make sure leg swelling is not an early sign of congestive heart failure.\par \par We have discussed further risk stratification as she has markedly elevated lipid levels which have proven somewhat resistant to treatment. She will undergo coronary calcium scoring. If indeed she has no evidence of coronary disease the question of whether continued lipid-lowering therapy is even indicated will be entertained

## 2022-06-16 ENCOUNTER — OUTPATIENT (OUTPATIENT)
Dept: OUTPATIENT SERVICES | Facility: HOSPITAL | Age: 62
LOS: 1 days | End: 2022-06-16
Payer: COMMERCIAL

## 2022-06-16 ENCOUNTER — APPOINTMENT (OUTPATIENT)
Dept: CT IMAGING | Facility: CLINIC | Age: 62
End: 2022-06-16
Payer: COMMERCIAL

## 2022-06-16 DIAGNOSIS — Z00.8 ENCOUNTER FOR OTHER GENERAL EXAMINATION: ICD-10-CM

## 2022-06-16 DIAGNOSIS — M79.89 OTHER SPECIFIED SOFT TISSUE DISORDERS: ICD-10-CM

## 2022-06-16 DIAGNOSIS — E78.5 HYPERLIPIDEMIA, UNSPECIFIED: ICD-10-CM

## 2022-06-16 PROCEDURE — 75571 CT HRT W/O DYE W/CA TEST: CPT

## 2022-06-16 PROCEDURE — 75571 CT HRT W/O DYE W/CA TEST: CPT | Mod: 26

## 2022-06-20 ENCOUNTER — APPOINTMENT (OUTPATIENT)
Dept: CARDIOLOGY | Facility: CLINIC | Age: 62
End: 2022-06-20
Payer: COMMERCIAL

## 2022-06-20 PROCEDURE — 93306 TTE W/DOPPLER COMPLETE: CPT

## 2022-07-22 ENCOUNTER — APPOINTMENT (OUTPATIENT)
Dept: FAMILY MEDICINE | Facility: CLINIC | Age: 62
End: 2022-07-22

## 2022-07-22 VITALS
RESPIRATION RATE: 20 BRPM | BODY MASS INDEX: 25.66 KG/M2 | HEART RATE: 68 BPM | HEIGHT: 65 IN | WEIGHT: 154 LBS | DIASTOLIC BLOOD PRESSURE: 75 MMHG | SYSTOLIC BLOOD PRESSURE: 122 MMHG

## 2022-07-22 PROCEDURE — 99214 OFFICE O/P EST MOD 30 MIN: CPT | Mod: 25

## 2022-07-22 PROCEDURE — 36415 COLL VENOUS BLD VENIPUNCTURE: CPT

## 2022-07-22 NOTE — ASSESSMENT
[FreeTextEntry1] : Hemodynamically stable with acceptable BP\par Cardiac status stable with no dysrhythmia appreciated clinically\par Lab profiles drawn in office and sent SURGERY

## 2022-07-22 NOTE — HISTORY OF PRESENT ILLNESS
[de-identified] : Presents for BP check, labs, and general follow-up.  Reviewed recent Cardiology documentation.  States feeling generally well; trying to watch diet.

## 2022-07-23 LAB
25(OH)D3 SERPL-MCNC: 43.3 NG/ML
ALBUMIN SERPL ELPH-MCNC: 4.5 G/DL
ALP BLD-CCNC: 61 U/L
ALT SERPL-CCNC: 18 U/L
ANION GAP SERPL CALC-SCNC: 9 MMOL/L
AST SERPL-CCNC: 18 U/L
BASOPHILS # BLD AUTO: 0.07 K/UL
BASOPHILS NFR BLD AUTO: 0.8 %
BILIRUB SERPL-MCNC: 0.3 MG/DL
BUN SERPL-MCNC: 20 MG/DL
CALCIUM SERPL-MCNC: 9.5 MG/DL
CHLORIDE SERPL-SCNC: 106 MMOL/L
CHOLEST SERPL-MCNC: 196 MG/DL
CO2 SERPL-SCNC: 26 MMOL/L
CREAT SERPL-MCNC: 0.91 MG/DL
EGFR: 70 ML/MIN/1.73M2
EOSINOPHIL # BLD AUTO: 0.16 K/UL
EOSINOPHIL NFR BLD AUTO: 1.7 %
FOLATE SERPL-MCNC: 12.8 NG/ML
GLUCOSE SERPL-MCNC: 95 MG/DL
HCT VFR BLD CALC: 41.3 %
HDLC SERPL-MCNC: 60 MG/DL
HGB BLD-MCNC: 13.3 G/DL
IMM GRANULOCYTES NFR BLD AUTO: 0.3 %
LDLC SERPL CALC-MCNC: 104 MG/DL
LYMPHOCYTES # BLD AUTO: 2.95 K/UL
LYMPHOCYTES NFR BLD AUTO: 32.1 %
MAN DIFF?: NORMAL
MCHC RBC-ENTMCNC: 30 PG
MCHC RBC-ENTMCNC: 32.2 GM/DL
MCV RBC AUTO: 93.2 FL
MONOCYTES # BLD AUTO: 0.67 K/UL
MONOCYTES NFR BLD AUTO: 7.3 %
NEUTROPHILS # BLD AUTO: 5.31 K/UL
NEUTROPHILS NFR BLD AUTO: 57.8 %
NONHDLC SERPL-MCNC: 135 MG/DL
PLATELET # BLD AUTO: 307 K/UL
POTASSIUM SERPL-SCNC: 4.4 MMOL/L
PROT SERPL-MCNC: 6.7 G/DL
RBC # BLD: 4.43 M/UL
RBC # FLD: 13.4 %
SODIUM SERPL-SCNC: 141 MMOL/L
T4 FREE SERPL-MCNC: 1 NG/DL
TRIGL SERPL-MCNC: 154 MG/DL
TSH SERPL-ACNC: 2.36 UIU/ML
VIT B12 SERPL-MCNC: 340 PG/ML
WBC # FLD AUTO: 9.19 K/UL

## 2022-09-21 ENCOUNTER — NON-APPOINTMENT (OUTPATIENT)
Age: 62
End: 2022-09-21

## 2023-01-30 ENCOUNTER — RX RENEWAL (OUTPATIENT)
Age: 63
End: 2023-01-30

## 2023-05-01 ENCOUNTER — RX RENEWAL (OUTPATIENT)
Age: 63
End: 2023-05-01

## 2023-07-26 ENCOUNTER — RX RENEWAL (OUTPATIENT)
Age: 63
End: 2023-07-26

## 2023-10-01 PROBLEM — Z92.3 HISTORY OF RADIATION THERAPY: Status: RESOLVED | Noted: 2017-04-11 | Resolved: 2023-10-01

## 2023-10-12 ENCOUNTER — APPOINTMENT (OUTPATIENT)
Dept: FAMILY MEDICINE | Facility: CLINIC | Age: 63
End: 2023-10-12
Payer: COMMERCIAL

## 2023-10-12 VITALS
RESPIRATION RATE: 20 BRPM | BODY MASS INDEX: 26.16 KG/M2 | HEIGHT: 65 IN | WEIGHT: 157 LBS | SYSTOLIC BLOOD PRESSURE: 125 MMHG | DIASTOLIC BLOOD PRESSURE: 70 MMHG | HEART RATE: 68 BPM

## 2023-10-12 DIAGNOSIS — R00.2 PALPITATIONS: ICD-10-CM

## 2023-10-12 DIAGNOSIS — Z23 ENCOUNTER FOR IMMUNIZATION: ICD-10-CM

## 2023-10-12 PROCEDURE — 36415 COLL VENOUS BLD VENIPUNCTURE: CPT

## 2023-10-12 PROCEDURE — 90677 PCV20 VACCINE IM: CPT

## 2023-10-12 PROCEDURE — G0009: CPT

## 2023-10-12 PROCEDURE — 99214 OFFICE O/P EST MOD 30 MIN: CPT | Mod: 25

## 2023-10-13 LAB
25(OH)D3 SERPL-MCNC: 29.2 NG/ML
ALBUMIN SERPL ELPH-MCNC: 4.7 G/DL
ALP BLD-CCNC: 59 U/L
ALT SERPL-CCNC: 19 U/L
ANION GAP SERPL CALC-SCNC: 11 MMOL/L
AST SERPL-CCNC: 18 U/L
BILIRUB SERPL-MCNC: 0.4 MG/DL
BUN SERPL-MCNC: 17 MG/DL
CALCIUM SERPL-MCNC: 9.7 MG/DL
CHLORIDE SERPL-SCNC: 105 MMOL/L
CHOLEST SERPL-MCNC: 205 MG/DL
CO2 SERPL-SCNC: 23 MMOL/L
CREAT SERPL-MCNC: 0.79 MG/DL
EGFR: 83 ML/MIN/1.73M2
ESTIMATED AVERAGE GLUCOSE: 105 MG/DL
FOLATE SERPL-MCNC: 12.1 NG/ML
GLUCOSE SERPL-MCNC: 90 MG/DL
HBA1C MFR BLD HPLC: 5.3 %
HCT VFR BLD CALC: 41.5 %
HDLC SERPL-MCNC: 56 MG/DL
HGB BLD-MCNC: 13.3 G/DL
LDLC SERPL CALC-MCNC: 127 MG/DL
MCHC RBC-ENTMCNC: 30.6 PG
MCHC RBC-ENTMCNC: 32 GM/DL
MCV RBC AUTO: 95.6 FL
NONHDLC SERPL-MCNC: 149 MG/DL
PLATELET # BLD AUTO: 300 K/UL
POTASSIUM SERPL-SCNC: 4.5 MMOL/L
PROT SERPL-MCNC: 6.9 G/DL
RBC # BLD: 4.34 M/UL
RBC # FLD: 13.3 %
SODIUM SERPL-SCNC: 140 MMOL/L
T4 FREE SERPL-MCNC: 1.1 NG/DL
TRIGL SERPL-MCNC: 121 MG/DL
TSH SERPL-ACNC: 2.89 UIU/ML
VIT B12 SERPL-MCNC: 404 PG/ML
WBC # FLD AUTO: 6.34 K/UL

## 2023-10-29 ENCOUNTER — RX RENEWAL (OUTPATIENT)
Age: 63
End: 2023-10-29

## 2024-01-10 ENCOUNTER — NON-APPOINTMENT (OUTPATIENT)
Age: 64
End: 2024-01-10

## 2024-01-16 ENCOUNTER — APPOINTMENT (OUTPATIENT)
Dept: FAMILY MEDICINE | Facility: CLINIC | Age: 64
End: 2024-01-16

## 2024-01-19 ENCOUNTER — APPOINTMENT (OUTPATIENT)
Dept: FAMILY MEDICINE | Facility: CLINIC | Age: 64
End: 2024-01-19
Payer: COMMERCIAL

## 2024-01-19 VITALS
DIASTOLIC BLOOD PRESSURE: 70 MMHG | BODY MASS INDEX: 26.49 KG/M2 | RESPIRATION RATE: 20 BRPM | HEART RATE: 68 BPM | SYSTOLIC BLOOD PRESSURE: 126 MMHG | HEIGHT: 65 IN | WEIGHT: 159 LBS

## 2024-01-19 DIAGNOSIS — E78.5 HYPERLIPIDEMIA, UNSPECIFIED: ICD-10-CM

## 2024-01-19 PROCEDURE — 36415 COLL VENOUS BLD VENIPUNCTURE: CPT

## 2024-01-19 PROCEDURE — 99214 OFFICE O/P EST MOD 30 MIN: CPT | Mod: 25

## 2024-01-19 NOTE — HISTORY OF PRESENT ILLNESS
[de-identified] : Presents for BP check and general follow-up with labs with attention to lipids.  States trying to improve diet.

## 2024-01-20 LAB
ALBUMIN SERPL ELPH-MCNC: 4.9 G/DL
ALP BLD-CCNC: 62 U/L
ALT SERPL-CCNC: 21 U/L
ANION GAP SERPL CALC-SCNC: 12 MMOL/L
AST SERPL-CCNC: 21 U/L
BILIRUB SERPL-MCNC: 0.4 MG/DL
BUN SERPL-MCNC: 14 MG/DL
CALCIUM SERPL-MCNC: 10 MG/DL
CHLORIDE SERPL-SCNC: 106 MMOL/L
CHOLEST SERPL-MCNC: 208 MG/DL
CO2 SERPL-SCNC: 24 MMOL/L
CREAT SERPL-MCNC: 0.81 MG/DL
EGFR: 81 ML/MIN/1.73M2
GLUCOSE SERPL-MCNC: 95 MG/DL
HDLC SERPL-MCNC: 61 MG/DL
LDLC SERPL CALC-MCNC: 124 MG/DL
NONHDLC SERPL-MCNC: 147 MG/DL
POTASSIUM SERPL-SCNC: 4.7 MMOL/L
PROT SERPL-MCNC: 7 G/DL
SODIUM SERPL-SCNC: 142 MMOL/L
TRIGL SERPL-MCNC: 127 MG/DL

## 2024-01-20 RX ORDER — ATORVASTATIN CALCIUM 40 MG/1
40 TABLET, FILM COATED ORAL
Qty: 90 | Refills: 3 | Status: ACTIVE | COMMUNITY
Start: 2024-01-20 | End: 1900-01-01

## 2024-01-20 RX ORDER — SIMVASTATIN 40 MG/1
40 TABLET, FILM COATED ORAL
Qty: 90 | Refills: 3 | Status: DISCONTINUED | COMMUNITY
Start: 2017-07-17 | End: 2024-01-20

## 2024-03-11 ENCOUNTER — NON-APPOINTMENT (OUTPATIENT)
Age: 64
End: 2024-03-11

## 2024-03-11 ENCOUNTER — APPOINTMENT (OUTPATIENT)
Dept: FAMILY MEDICINE | Facility: CLINIC | Age: 64
End: 2024-03-11
Payer: COMMERCIAL

## 2024-03-11 VITALS — SYSTOLIC BLOOD PRESSURE: 139 MMHG | DIASTOLIC BLOOD PRESSURE: 89 MMHG

## 2024-03-11 VITALS
WEIGHT: 160 LBS | SYSTOLIC BLOOD PRESSURE: 152 MMHG | HEIGHT: 65 IN | TEMPERATURE: 96.5 F | BODY MASS INDEX: 26.66 KG/M2 | DIASTOLIC BLOOD PRESSURE: 92 MMHG | OXYGEN SATURATION: 97 % | RESPIRATION RATE: 18 BRPM | HEART RATE: 85 BPM

## 2024-03-11 DIAGNOSIS — M79.89 OTHER SPECIFIED SOFT TISSUE DISORDERS: ICD-10-CM

## 2024-03-11 DIAGNOSIS — I10 ESSENTIAL (PRIMARY) HYPERTENSION: ICD-10-CM

## 2024-03-11 DIAGNOSIS — M25.562 PAIN IN RIGHT KNEE: ICD-10-CM

## 2024-03-11 DIAGNOSIS — G89.29 PAIN IN RIGHT KNEE: ICD-10-CM

## 2024-03-11 DIAGNOSIS — M25.561 PAIN IN RIGHT KNEE: ICD-10-CM

## 2024-03-11 DIAGNOSIS — E53.8 DEFICIENCY OF OTHER SPECIFIED B GROUP VITAMINS: ICD-10-CM

## 2024-03-11 PROCEDURE — 99214 OFFICE O/P EST MOD 30 MIN: CPT

## 2024-03-11 PROCEDURE — 36415 COLL VENOUS BLD VENIPUNCTURE: CPT

## 2024-03-11 PROCEDURE — 93000 ELECTROCARDIOGRAM COMPLETE: CPT

## 2024-03-11 NOTE — PLAN
[FreeTextEntry1] : elevated BP: monitor BP at home. dash DIET.  ELEVATE LEGS WHEN RESTING, MONITOR WEIGHT.   Check labs.   Advil for knee pain, Ice application. Avoiding twisting, squatting.  EKG NSR.

## 2024-03-11 NOTE — PHYSICAL EXAM
[No Acute Distress] : no acute distress [Well Nourished] : well nourished [Well Developed] : well developed [Normal Sclera/Conjunctiva] : normal sclera/conjunctiva [Well-Appearing] : well-appearing [PERRL] : pupils equal round and reactive to light [Normal Outer Ear/Nose] : the outer ears and nose were normal in appearance [EOMI] : extraocular movements intact [No JVD] : no jugular venous distention [Normal Oropharynx] : the oropharynx was normal [No Lymphadenopathy] : no lymphadenopathy [Thyroid Normal, No Nodules] : the thyroid was normal and there were no nodules present [Supple] : supple [No Respiratory Distress] : no respiratory distress  [Clear to Auscultation] : lungs were clear to auscultation bilaterally [No Accessory Muscle Use] : no accessory muscle use [Regular Rhythm] : with a regular rhythm [Normal Rate] : normal rate  [Normal S1, S2] : normal S1 and S2 [No Carotid Bruits] : no carotid bruits [No Murmur] : no murmur heard [No Abdominal Bruit] : a ~M bruit was not heard ~T in the abdomen [No Varicosities] : no varicosities [Pedal Pulses Present] : the pedal pulses are present [No Edema] : there was no peripheral edema [No Palpable Aorta] : no palpable aorta [No Extremity Clubbing/Cyanosis] : no extremity clubbing/cyanosis [Non Tender] : non-tender [Soft] : abdomen soft [Non-distended] : non-distended [No Masses] : no abdominal mass palpated [No HSM] : no HSM [Normal Bowel Sounds] : normal bowel sounds [Normal Posterior Cervical Nodes] : no posterior cervical lymphadenopathy [Normal Anterior Cervical Nodes] : no anterior cervical lymphadenopathy [No Spinal Tenderness] : no spinal tenderness [No CVA Tenderness] : no CVA  tenderness [No Joint Swelling] : no joint swelling [Grossly Normal Strength/Tone] : grossly normal strength/tone [No Rash] : no rash [Coordination Grossly Intact] : coordination grossly intact [No Focal Deficits] : no focal deficits [Normal Gait] : normal gait [Deep Tendon Reflexes (DTR)] : deep tendon reflexes were 2+ and symmetric [Normal Insight/Judgement] : insight and judgment were intact [Normal Affect] : the affect was normal [de-identified] : right knee discomfort on outer aspect on extention , right knee and upper calf swelling , no redness

## 2024-03-11 NOTE — HISTORY OF PRESENT ILLNESS
[Musculoskeletal Symptoms Knees] : knees [___ Weeks ago] : [unfilled] weeks ago [Paroxysmal] : paroxysmal [Moderate] : moderate [Ice] : ice [OTC Remedies] : OTC remedies [Activity] : with activity [Stable] : stable [FreeTextEntry3] : 6-7/10 [FreeTextEntry7] : right worse than left  [FreeTextEntry2] : knee giving out.  [FreeTextEntry5] : Advil [FreeTextEntry4] : bending knee  [FreeTextEntry8] : His right knee is giving out sometimes on standing. No injury. No prior episode. last Travel was from 2/13-2/24/24.  C/O right calf swelling for 1 month. No back pain, mild feet tingling.

## 2024-03-11 NOTE — HEALTH RISK ASSESSMENT
[No falls in past year] : Patient reported no falls in the past year [No] : In the past 12 months have you used drugs other than those required for medical reasons? No [0] : 2) Feeling down, depressed, or hopeless: Not at all (0) [PHQ-2 Negative - No further assessment needed] : PHQ-2 Negative - No further assessment needed [Never] : Never [de-identified] : regular [de-identified] : sedentary [[id number]] : 0

## 2024-03-15 ENCOUNTER — APPOINTMENT (OUTPATIENT)
Dept: ULTRASOUND IMAGING | Facility: CLINIC | Age: 64
End: 2024-03-15
Payer: COMMERCIAL

## 2024-03-15 ENCOUNTER — APPOINTMENT (OUTPATIENT)
Dept: RADIOLOGY | Facility: CLINIC | Age: 64
End: 2024-03-15
Payer: COMMERCIAL

## 2024-03-15 ENCOUNTER — OUTPATIENT (OUTPATIENT)
Dept: OUTPATIENT SERVICES | Facility: HOSPITAL | Age: 64
LOS: 1 days | End: 2024-03-15
Payer: COMMERCIAL

## 2024-03-15 DIAGNOSIS — Z00.8 ENCOUNTER FOR OTHER GENERAL EXAMINATION: ICD-10-CM

## 2024-03-15 DIAGNOSIS — M79.89 OTHER SPECIFIED SOFT TISSUE DISORDERS: ICD-10-CM

## 2024-03-15 PROCEDURE — 93970 EXTREMITY STUDY: CPT

## 2024-03-15 PROCEDURE — 73562 X-RAY EXAM OF KNEE 3: CPT

## 2024-03-15 PROCEDURE — 93970 EXTREMITY STUDY: CPT | Mod: 26

## 2024-03-15 PROCEDURE — 73562 X-RAY EXAM OF KNEE 3: CPT | Mod: 26,50

## 2024-03-19 LAB
ALBUMIN SERPL ELPH-MCNC: 4.7 G/DL
ALP BLD-CCNC: 63 U/L
ALT SERPL-CCNC: 16 U/L
ANA SER IF-ACNC: NEGATIVE
ANION GAP SERPL CALC-SCNC: 20 MMOL/L
AST SERPL-CCNC: 23 U/L
BILIRUB SERPL-MCNC: 0.4 MG/DL
BUN SERPL-MCNC: 16 MG/DL
CALCIUM SERPL-MCNC: 10.3 MG/DL
CHLORIDE SERPL-SCNC: 109 MMOL/L
CK SERPL-CCNC: 218 U/L
CO2 SERPL-SCNC: 13 MMOL/L
CREAT SERPL-MCNC: 0.69 MG/DL
CRP SERPL-MCNC: <3 MG/L
EGFR: 96 ML/MIN/1.73M2
ERYTHROCYTE [SEDIMENTATION RATE] IN BLOOD BY WESTERGREN METHOD: 13 MM/HR
GLUCOSE SERPL-MCNC: 101 MG/DL
NT-PROBNP SERPL-MCNC: 51 PG/ML
POTASSIUM SERPL-SCNC: 5 MMOL/L
PROT SERPL-MCNC: 7.5 G/DL
RHEUMATOID FACT SER QL: <10 IU/ML
SODIUM SERPL-SCNC: 142 MMOL/L
URATE SERPL-MCNC: 4.2 MG/DL

## 2024-03-24 ENCOUNTER — NON-APPOINTMENT (OUTPATIENT)
Age: 64
End: 2024-03-24

## 2024-03-27 ENCOUNTER — APPOINTMENT (OUTPATIENT)
Dept: ORTHOPEDIC SURGERY | Facility: CLINIC | Age: 64
End: 2024-03-27
Payer: COMMERCIAL

## 2024-03-27 PROCEDURE — 73564 X-RAY EXAM KNEE 4 OR MORE: CPT | Mod: RT

## 2024-03-27 PROCEDURE — 20610 DRAIN/INJ JOINT/BURSA W/O US: CPT | Mod: RT

## 2024-03-27 PROCEDURE — 99204 OFFICE O/P NEW MOD 45 MIN: CPT | Mod: 25

## 2024-03-27 NOTE — DISCUSSION/SUMMARY
[de-identified] : I went over the pathophysiology of the patient's symptoms in great detail with the patient. I discussed the underlying pathophysiology of the patient's condition in great detail with the patient. I went over the patient's x-rays with them in great detail. The patient elected to receive a cortisone injection into her right knee today and tolerated it well. I instructed the patient on ROM exercises and told them to take it easy. The use of ice and rest was reviewed with the patient. The patient may resume activities tomorrow.   All of their questions were answered. They understand and consent to the plan.   FU in one month. We will consider an MRI of her right knee upon her return.

## 2024-03-27 NOTE — HISTORY OF PRESENT ILLNESS
[de-identified] : 64 year old female presents complaining of right knee pain and swelling that started about 1 month ago. She denies injury. She notes that her pain worsens the more active she is. Walking for periods of time and going up and down stairs has become uncomfortable. She feels that her knee may buckle intermittently. The swelling comes and goes and causes stiffness in the knee. She cannot fully extend her right knee. Flexion is easier for her. She has not been treated for right knee pain in the past. She did see Dr. Peters who sent her for x-rays of both knees and doppler of the right lower extremity. She was told the x-rays were unremarkable and the doppler was negative for DVT.   IMPRESSION: X-rays bilateral knee done on 3/15/14 Unremarkable bilateral knees.  --- End of Report ---  She had x-rays of both knees done on 03/15/24 at [facility] at [place] revealed:

## 2024-03-27 NOTE — ADDENDUM
[FreeTextEntry1] : I, DIANA LÓPEZ, acted solely as a scribe for Dr. Yoav Butler on this date 03/27/2024.  All medical record entries made by the Scribe were at my, Dr. Yova Butler, direction and personally dictated by me on 03/27/2024. I have reviewed the chart and agree that the record accurately reflects my personal performance of the history, physical exam, assessment and plan. I have also personally directed, reviewed, and agreed with the chart.

## 2024-03-27 NOTE — PHYSICAL EXAM
[de-identified] : Constitutional o Appearance : well-nourished, well developed, alert, in no acute distress  Head and Face o Head :  Inspection : atraumatic, normocephalic o Face :  Inspection : no visible rash or discoloration Respiratory o Respiratory Effort: breathing unlabored  Neurologic o Mental Status Examination :  Orientation : grossly oriented to person, place and time Psychiatric o Mood and Affect: mood normal, affect appropriate   Right Lower Extremity o Buttock : no tenderness, swelling or deformities  o Right Hip :  Inspection/Palpation : no tenderness, swelling or deformities  Range of Motion : full and painless in all planes, no crepitance  Stability : joint stability intact  Strength : extension, flexion, adduction, abduction, internal rotation and external rotation 5/5   o Right Knee :  Inspection/Palpation : lateral compartment tenderness to palpation, mild swelling, valgus deformity   Range of Motion : 7--135 with pain at the extremes of motion no crepitance, decreased patellofemoral glide   Stability : no valgus or varus instability present on provocative testing  Strength : flexion and extension 5/5  Tests and Signs : Anterior Drawer negative, Lachman negative, Aditi's negative  Left Lower Extremity o Buttock : no tenderness, swelling or deformities  o Left Hip :  Inspection/Palpation : no tenderness, no swelling or deformities  Range of Motion : full and painless in all planes, no crepitance  Stability : joint stability intact  Strength : extension, flexion, adduction, abduction, internal rotation and external rotation 5/5  o Left Knee :  Inspection/Palpation : no tenderness to palpation, no swelling, valgus deformity   Range of Motion : active flexion and extension full and painless, no crepitance, decreased patellofemoral glide   Stability : no valgus or varus instability present on provocative testing  Strength : flexion and extension 5/5  Tests and Signs : Anterior Drawer negative, Lachman negative, Aditi's negative  Gait: antalgic gait, valgus deformity, no significant extremity swelling or lymphedema  Radiology Results: 3/27/2024 Right Knee: Standing AP, lateral, tunnel and skyline views were obtained and reveal mild tricompartmental osteoarthritis with mild patella miranda   o Knee injection : Indication- right knee osteoarthritis, Anatomic location- right intra-articular joint space, Spray - area was sterilized with Betadine and alcohol and anesthetized with Ethyl Chloride , needle used-20G, Medications given- 5cc's lidocaine, 0.5cc's kenalog, 0.5 cc's dexamethasone. The patient tolerated the procedure well.

## 2024-04-10 ENCOUNTER — APPOINTMENT (OUTPATIENT)
Dept: ORTHOPEDIC SURGERY | Facility: CLINIC | Age: 64
End: 2024-04-10
Payer: COMMERCIAL

## 2024-04-10 VITALS — HEIGHT: 65 IN | WEIGHT: 160 LBS | BODY MASS INDEX: 26.66 KG/M2

## 2024-04-10 DIAGNOSIS — M23.92 UNSPECIFIED INTERNAL DERANGEMENT OF LEFT KNEE: ICD-10-CM

## 2024-04-10 DIAGNOSIS — M23.91 UNSPECIFIED INTERNAL DERANGEMENT OF RIGHT KNEE: ICD-10-CM

## 2024-04-10 PROCEDURE — 99213 OFFICE O/P EST LOW 20 MIN: CPT

## 2024-04-10 NOTE — HISTORY OF PRESENT ILLNESS
[de-identified] : 64 year old female presents for a follow-up of right knee pain and swelling that started about 2 month ago. She had a right knee cortisone injection on 3/27/2024 and reports it worked the same night. She states she is 90% better. She states the severity of the pain is a 1 or 2 out of 10 after the cortisone injection. She denies any swelling or buckling. She states she cannot extend her knee all the way. She states she has pain with extension of her right knee. She states she does not have trouble walking long distances.   Radiology Results: 3/27/2024 Right Knee: Standing AP, lateral, tunnel and skyline views were obtained and reveal mild tricompartmental osteoarthritis with mild patella miranda   IMPRESSION: X-rays bilateral knee done on 3/15/14 Unremarkable bilateral knees.  --- End of Report ---  She had x-rays of both knees done on 03/15/24 at [facility] at [place] revealed:

## 2024-04-10 NOTE — ADDENDUM
[FreeTextEntry1] : I, DIANA LÓPEZ, acted solely as a scribe for Dr. Yoav Butler on this date 04/10/2024.  All medical record entries made by the Scribe were at my, Dr. Yoav Butler, direction and personally dictated by me on 04/10/2024. I have reviewed the chart and agree that the record accurately reflects my personal performance of the history, physical exam, assessment and plan. I have also personally directed, reviewed, and agreed with the chart.

## 2024-04-10 NOTE — PHYSICAL EXAM
[de-identified] : Constitutional o Appearance : well-nourished, well developed, alert, in no acute distress  Head and Face o Head :  Inspection : atraumatic, normocephalic o Face :  Inspection : no visible rash or discoloration Respiratory o Respiratory Effort: breathing unlabored  Neurologic o Mental Status Examination :  Orientation : grossly oriented to person, place and time Psychiatric o Mood and Affect: mood normal, affect appropriate   Right Lower Extremity o Buttock : no tenderness, swelling or deformities  o Right Hip :  Inspection/Palpation : no tenderness, swelling or deformities  Range of Motion : full and painless in all planes, no crepitance  Stability : joint stability intact  Strength : extension, flexion, adduction, abduction, internal rotation and external rotation 5/5   o Right Knee :  Inspection/Palpation : medial facet tenderness and lateral compartment tenderness lateral compartment tenderness to palpation, mild swelling, valgus deformity   Range of Motion : 7--135 with pain at the extremes of motion no crepitance, decreased patellofemoral glide   Stability : no valgus or varus instability present on provocative testing  Strength : flexion and extension 5/5  Tests and Signs : Anterior Drawer negative, Lachman negative, Aditi's negative  Left Lower Extremity o Buttock : no tenderness, swelling or deformities  o Left Hip :  Inspection/Palpation : no tenderness, no swelling or deformities  Range of Motion : full and painless in all planes, no crepitance  Stability : joint stability intact  Strength : extension, flexion, adduction, abduction, internal rotation and external rotation 5/5  o Left Knee :  Inspection/Palpation : no tenderness to palpation, no swelling, valgus deformity   Range of Motion : active flexion and extension full and painless, no crepitance, decreased patellofemoral glide   Stability : no valgus or varus instability present on provocative testing  Strength : flexion and extension 5/5  Tests and Signs : Anterior Drawer negative, Lachman negative, Aditi's negative  Gait: heel/toe , valgus deformity, no significant extremity swelling or lymphedema

## 2024-04-10 NOTE — DISCUSSION/SUMMARY
[de-identified] : I went over the pathophysiology of the patient's symptoms in great detail with the patient. We are requesting authorization for an MRI of the patients right knee.  The reason the MRI is appropriate is she is not fully extending and has pain with extension.  We would like to delineate between the difference of patellofemoral arthritis from a possible posterior horn meniscal tear with a flipped component.  She will continue with her home exercise on her own avoid hyperextension  All of their questions were answered. They understand and consent to the plan.   FU after MRI

## 2024-04-17 ENCOUNTER — OUTPATIENT (OUTPATIENT)
Dept: OUTPATIENT SERVICES | Facility: HOSPITAL | Age: 64
LOS: 1 days | End: 2024-04-17
Payer: COMMERCIAL

## 2024-04-17 ENCOUNTER — APPOINTMENT (OUTPATIENT)
Dept: MRI IMAGING | Facility: HOSPITAL | Age: 64
End: 2024-04-17
Payer: COMMERCIAL

## 2024-04-17 DIAGNOSIS — M23.91 UNSPECIFIED INTERNAL DERANGEMENT OF RIGHT KNEE: ICD-10-CM

## 2024-04-17 PROCEDURE — 73721 MRI JNT OF LWR EXTRE W/O DYE: CPT | Mod: 26,RT

## 2024-04-17 PROCEDURE — 73721 MRI JNT OF LWR EXTRE W/O DYE: CPT

## 2024-04-19 ENCOUNTER — APPOINTMENT (OUTPATIENT)
Dept: FAMILY MEDICINE | Facility: CLINIC | Age: 64
End: 2024-04-19

## 2024-04-19 ENCOUNTER — EMERGENCY (EMERGENCY)
Facility: HOSPITAL | Age: 64
LOS: 1 days | End: 2024-04-19
Attending: INTERNAL MEDICINE | Admitting: INTERNAL MEDICINE
Payer: COMMERCIAL

## 2024-04-19 ENCOUNTER — RESULT REVIEW (OUTPATIENT)
Age: 64
End: 2024-04-19

## 2024-04-19 VITALS
SYSTOLIC BLOOD PRESSURE: 177 MMHG | TEMPERATURE: 97 F | DIASTOLIC BLOOD PRESSURE: 133 MMHG | HEART RATE: 75 BPM | OXYGEN SATURATION: 97 % | HEIGHT: 67 IN | WEIGHT: 149.91 LBS | RESPIRATION RATE: 18 BRPM

## 2024-04-19 DIAGNOSIS — Z90.13 ACQUIRED ABSENCE OF BILATERAL BREASTS AND NIPPLES: Chronic | ICD-10-CM

## 2024-04-19 LAB
ALBUMIN SERPL ELPH-MCNC: 3.8 G/DL — SIGNIFICANT CHANGE UP (ref 3.3–5)
ALP SERPL-CCNC: 57 U/L — SIGNIFICANT CHANGE UP (ref 40–120)
ALT FLD-CCNC: 26 U/L — SIGNIFICANT CHANGE UP (ref 10–45)
ANION GAP SERPL CALC-SCNC: 7 MMOL/L — SIGNIFICANT CHANGE UP (ref 5–17)
AST SERPL-CCNC: 27 U/L — SIGNIFICANT CHANGE UP (ref 10–40)
BASOPHILS # BLD AUTO: 0.06 K/UL — SIGNIFICANT CHANGE UP (ref 0–0.2)
BASOPHILS NFR BLD AUTO: 0.7 % — SIGNIFICANT CHANGE UP (ref 0–2)
BILIRUB SERPL-MCNC: 0.7 MG/DL — SIGNIFICANT CHANGE UP (ref 0.2–1.2)
BUN SERPL-MCNC: 17 MG/DL — SIGNIFICANT CHANGE UP (ref 7–23)
CALCIUM SERPL-MCNC: 9.3 MG/DL — SIGNIFICANT CHANGE UP (ref 8.4–10.5)
CHLORIDE SERPL-SCNC: 105 MMOL/L — SIGNIFICANT CHANGE UP (ref 96–108)
CO2 SERPL-SCNC: 26 MMOL/L — SIGNIFICANT CHANGE UP (ref 22–31)
CREAT SERPL-MCNC: 0.5 MG/DL — SIGNIFICANT CHANGE UP (ref 0.5–1.3)
EGFR: 103 ML/MIN/1.73M2 — SIGNIFICANT CHANGE UP
EOSINOPHIL # BLD AUTO: 0.1 K/UL — SIGNIFICANT CHANGE UP (ref 0–0.5)
EOSINOPHIL NFR BLD AUTO: 1.2 % — SIGNIFICANT CHANGE UP (ref 0–6)
GLUCOSE SERPL-MCNC: 90 MG/DL — SIGNIFICANT CHANGE UP (ref 70–99)
HCT VFR BLD CALC: 40.8 % — SIGNIFICANT CHANGE UP (ref 34.5–45)
HGB BLD-MCNC: 13.5 G/DL — SIGNIFICANT CHANGE UP (ref 11.5–15.5)
IMM GRANULOCYTES NFR BLD AUTO: 0.1 % — SIGNIFICANT CHANGE UP (ref 0–0.9)
INR BLD: 0.94 RATIO — SIGNIFICANT CHANGE UP (ref 0.85–1.18)
LYMPHOCYTES # BLD AUTO: 2.65 K/UL — SIGNIFICANT CHANGE UP (ref 1–3.3)
LYMPHOCYTES # BLD AUTO: 31.9 % — SIGNIFICANT CHANGE UP (ref 13–44)
MCHC RBC-ENTMCNC: 30.3 PG — SIGNIFICANT CHANGE UP (ref 27–34)
MCHC RBC-ENTMCNC: 33.1 GM/DL — SIGNIFICANT CHANGE UP (ref 32–36)
MCV RBC AUTO: 91.5 FL — SIGNIFICANT CHANGE UP (ref 80–100)
MONOCYTES # BLD AUTO: 0.67 K/UL — SIGNIFICANT CHANGE UP (ref 0–0.9)
MONOCYTES NFR BLD AUTO: 8.1 % — SIGNIFICANT CHANGE UP (ref 2–14)
NEUTROPHILS # BLD AUTO: 4.83 K/UL — SIGNIFICANT CHANGE UP (ref 1.8–7.4)
NEUTROPHILS NFR BLD AUTO: 58 % — SIGNIFICANT CHANGE UP (ref 43–77)
NRBC # BLD: 0 /100 WBCS — SIGNIFICANT CHANGE UP (ref 0–0)
NT-PROBNP SERPL-SCNC: 79 PG/ML — SIGNIFICANT CHANGE UP (ref 0–300)
PLATELET # BLD AUTO: 290 K/UL — SIGNIFICANT CHANGE UP (ref 150–400)
POTASSIUM SERPL-MCNC: 4.6 MMOL/L — SIGNIFICANT CHANGE UP (ref 3.5–5.3)
POTASSIUM SERPL-SCNC: 4.6 MMOL/L — SIGNIFICANT CHANGE UP (ref 3.5–5.3)
PROT SERPL-MCNC: 7.3 G/DL — SIGNIFICANT CHANGE UP (ref 6–8.3)
PROTHROM AB SERPL-ACNC: 11 SEC — SIGNIFICANT CHANGE UP (ref 9.5–13)
RBC # BLD: 4.46 M/UL — SIGNIFICANT CHANGE UP (ref 3.8–5.2)
RBC # FLD: 13 % — SIGNIFICANT CHANGE UP (ref 10.3–14.5)
SODIUM SERPL-SCNC: 138 MMOL/L — SIGNIFICANT CHANGE UP (ref 135–145)
TROPONIN I, HIGH SENSITIVITY RESULT: <4 NG/L — SIGNIFICANT CHANGE UP
TROPONIN I, HIGH SENSITIVITY RESULT: <4 NG/L — SIGNIFICANT CHANGE UP
WBC # BLD: 8.32 K/UL — SIGNIFICANT CHANGE UP (ref 3.8–10.5)
WBC # FLD AUTO: 8.32 K/UL — SIGNIFICANT CHANGE UP (ref 3.8–10.5)

## 2024-04-19 PROCEDURE — 93306 TTE W/DOPPLER COMPLETE: CPT | Mod: 26

## 2024-04-19 PROCEDURE — 71045 X-RAY EXAM CHEST 1 VIEW: CPT | Mod: 26

## 2024-04-19 PROCEDURE — 99222 1ST HOSP IP/OBS MODERATE 55: CPT

## 2024-04-19 PROCEDURE — 99285 EMERGENCY DEPT VISIT HI MDM: CPT

## 2024-04-19 PROCEDURE — 99223 1ST HOSP IP/OBS HIGH 75: CPT

## 2024-04-19 PROCEDURE — 93010 ELECTROCARDIOGRAM REPORT: CPT

## 2024-04-19 RX ORDER — LOSARTAN POTASSIUM 100 MG/1
50 TABLET, FILM COATED ORAL DAILY
Refills: 0 | Status: DISCONTINUED | OUTPATIENT
Start: 2024-04-20 | End: 2024-04-22

## 2024-04-19 RX ORDER — ATORVASTATIN CALCIUM 80 MG/1
40 TABLET, FILM COATED ORAL AT BEDTIME
Refills: 0 | Status: DISCONTINUED | OUTPATIENT
Start: 2024-04-20 | End: 2024-04-22

## 2024-04-19 RX ORDER — LOSARTAN POTASSIUM 100 MG/1
1 TABLET, FILM COATED ORAL
Qty: 0 | Refills: 0 | DISCHARGE

## 2024-04-19 RX ORDER — ACETAMINOPHEN 500 MG
650 TABLET ORAL EVERY 6 HOURS
Refills: 0 | Status: DISCONTINUED | OUTPATIENT
Start: 2024-04-19 | End: 2024-04-22

## 2024-04-19 RX ORDER — CHOLECALCIFEROL (VITAMIN D3) 125 MCG
0 CAPSULE ORAL
Qty: 0 | Refills: 0 | DISCHARGE

## 2024-04-19 RX ORDER — ENOXAPARIN SODIUM 100 MG/ML
40 INJECTION SUBCUTANEOUS EVERY 24 HOURS
Refills: 0 | Status: DISCONTINUED | OUTPATIENT
Start: 2024-04-20 | End: 2024-04-22

## 2024-04-19 RX ORDER — ONDANSETRON 8 MG/1
4 TABLET, FILM COATED ORAL EVERY 8 HOURS
Refills: 0 | Status: DISCONTINUED | OUTPATIENT
Start: 2024-04-19 | End: 2024-04-22

## 2024-04-19 RX ORDER — LANOLIN ALCOHOL/MO/W.PET/CERES
3 CREAM (GRAM) TOPICAL AT BEDTIME
Refills: 0 | Status: DISCONTINUED | OUTPATIENT
Start: 2024-04-19 | End: 2024-04-22

## 2024-04-19 RX ORDER — SIMVASTATIN 20 MG/1
1 TABLET, FILM COATED ORAL
Qty: 0 | Refills: 0 | DISCHARGE

## 2024-04-19 NOTE — PATIENT PROFILE ADULT - NSPRESCRALCFREQ_GEN_A_NUR
Back and painful joints
Pharm calling to get clarification on where pt is using the Voltaren gel? Please advise.
2-4 times a month

## 2024-04-19 NOTE — ED PROVIDER NOTE - NSICDXPASTMEDICALHX_GEN_ALL_CORE_FT
PAST MEDICAL HISTORY:  Breast Cancer see HPI    High cholesterol     Hypertension     Migraine Headache

## 2024-04-19 NOTE — ED ADULT NURSE NOTE - OBJECTIVE STATEMENT
Pt BIB EMS to ED with c/o chest pain.  Pt reports sudden onset of sternal chest pain, described as pressure sensation, and b/l jaw burning when climbing the stairs at work.  Reports pain at that time 6/10.  EMS activated at site, and patient given 324 mg of ASA PTA.  On arrival, pt denies any chest pain, chest palpitations, SOB, dizziness.  Reports similar history of chest pain several years prior, followed by cardiology outpatient and placed on Holter monitor at that time with no abnormal results.  EKG completed.  Continuous cardiac monitoring initiated.

## 2024-04-19 NOTE — PATIENT PROFILE ADULT - VISION (WITH CORRECTIVE LENSES IF THE PATIENT USUALLY WEARS THEM):
eyesight glasses/Partially impaired: cannot see medication labels or newsprint, but can see obstacles in path, and the surrounding layout; can count fingers at arm's length

## 2024-04-19 NOTE — CONSULT NOTE ADULT - SUBJECTIVE AND OBJECTIVE BOX
JONATHAN SHELLEY  [id number]      HPI:  64 year old female with past medical history of HTN, HLD, prediabetes, breast cancer s/p radiation/lumpectomy w/ recurrence s/p bilateral mastectomy and reconstruction (2010), right knee OA, who presents with episode of chest pressure.  Patient states that she was at work and after walking up one flight of stairs she developed mid sternal chest tightness/pressure associated with burning sensation in her jaws. No associated LH/SOB/palpitation. No alleviating or exacerbating factors. Symptoms lasted 5-6 minutes and resolved on its own. No symptoms currently.  She states that she had similar symptoms in the past and saw Dr. Hinojosa in office in 2022. She had CT done with total Agatston coronary artery calcium score of 4.     In the ED, she was afebrile, /133 on presentation.        ALLERGIES:  adhesives (Hives)  gadilidium dye with MRI (Rash; Flushing)  sulfa drugs (Nausea)      PAST MEDICAL & SURGICAL HISTORY:  Breast Cancer  see HPI      Migraine Headache      High cholesterol      Hypertension      S/P Breast Lumpectomy  right side 11/08, reexcision, 12/08 right sentinal node biopsy  left side 11/09      S/P Tonsillectomy  age [age]      S/P Ovarian Cystectomy  laparoscopic, in the 1990's      H/O bilateral mastectomy            CURRENT MEDICATIONS:  MEDICATIONS  (STANDING):    MEDICATIONS  (PRN):  acetaminophen     Tablet .. 650 milliGRAM(s) Oral every 6 hours PRN Temp greater or equal to 38C (100.4F), Mild Pain (1 - 3)  aluminum hydroxide/magnesium hydroxide/simethicone Suspension 30 milliLiter(s) Oral every 4 hours PRN Dyspepsia  melatonin 3 milliGRAM(s) Oral at bedtime PRN Insomnia  ondansetron Injectable 4 milliGRAM(s) IV Push every 8 hours PRN Nausea and/or Vomiting      SOCIAL HISTORY:  denies    FAMILY HISTORY:  FH: CAD (coronary artery disease) (Father)        ROS:  All 10 systems reviewed and positives noted in HPI    OBJECTIVE:    VITAL SIGNS:  Vital Signs Last 24 Hrs  T(C): 36 (19 Apr 2024 10:06), Max: 36 (19 Apr 2024 10:06)  T(F): 96.8 (19 Apr 2024 10:06), Max: 96.8 (19 Apr 2024 10:06)  HR: 65 (19 Apr 2024 11:23) (65 - 75)  BP: 131/79 (19 Apr 2024 11:23) (131/79 - 177/133)  BP(mean): --  RR: 22 (19 Apr 2024 11:23) (18 - 22)  SpO2: 97% (19 Apr 2024 11:23) (97% - 97%)    Parameters below as of 19 Apr 2024 11:23  Patient On (Oxygen Delivery Method): room air        PHYSICAL EXAM:  General:  no distress  HEENT: sclera anicteric  Neck: supple, no carotid bruits b/l  CVS: JVP ~ 7 cm H20, RRR, s1, s2, no murmurs/rubs/gallops  Chest: unlabored respirations, clear to auscultation b/l  Abdomen: non-distended  Extremities: no lower extremity edema b/l  Neuro: awake, alert & oriented x 3  Psych: normal affect      LABS:                        13.5   8.32  )-----------( 290      ( 19 Apr 2024 10:55 )             40.8     04-19    138  |  105  |  17  ----------------------------<  90  4.6   |  26  |  0.50    Ca    9.3      19 Apr 2024 10:55    TPro  7.3  /  Alb  3.8  /  TBili  0.7  /  DBili  x   /  AST  27  /  ALT  26  /  AlkPhos  57  04-19        PT/INR - ( 19 Apr 2024 10:55 )   PT: 11.0 sec;   INR: 0.94 ratio           ECG: Sinus rhythm with nonspecific ST and T wave abnormalities      TTE:

## 2024-04-19 NOTE — ED PROVIDER NOTE - NSICDXPASTSURGICALHX_GEN_ALL_CORE_FT
PAST SURGICAL HISTORY:  S/P Breast Lumpectomy right side 11/08, reexcision, 12/08 right sentinal node biopsy  left side 11/09    S/P Ovarian Cystectomy laparoscopic, in the 1990's    S/P Tonsillectomy age [age]

## 2024-04-19 NOTE — PATIENT PROFILE ADULT - FUNCTIONAL ASSESSMENT - BASIC MOBILITY 6.
3-calculated by average/Not able to assess (calculate score using [facility] averaging method)

## 2024-04-19 NOTE — CONSULT NOTE ADULT - NS ATTEND AMEND GEN_ALL_CORE FT
Patient seen and examined independently.    no further symptoms    had calcium score of 4-2 years ago and ekg without acute changes,echo withput wma troponin neg x2    repeat ekg and troponin in am and if still normal may dc

## 2024-04-19 NOTE — ED PROVIDER NOTE - OBJECTIVE STATEMENT
64-year-old female came to the emergency room chief complaint of chest pressure started today lasted for 6 months constant associated with no nausea vomiting  dizziness diaphoresis pain resolved spontaneously patient was given 324 of aspirin after by the EMS pain did not recur no radiation of pain but patient did say that she felt like burning sensation in the jaw

## 2024-04-19 NOTE — ED PROVIDER NOTE - CLINICAL SUMMARY MEDICAL DECISION MAKING FREE TEXT BOX
64-year-old female came to the emergency room chief complaint of chest pressure started today lasted for 6 months constant associated with no nausea vomiting  dizziness diaphoresis pain resolved spontaneously patient was given 324 of aspirin after by the EMS pain did not recur no radiation of pain but patient did say that she felt like burning sensation in the jaw  Patient had a CT of the heart 2 years ago with a calcium score of 4 normal echocardiogram 64-year-old female came to the emergency room chief complaint of chest pressure started today lasted for 6 months constant associated with no nausea vomiting  dizziness diaphoresis pain resolved spontaneously patient was given 324 of aspirin after by the EMS pain did not recur no radiation of pain but patient did say that she felt like burning sensation in the jaw  Patient had a CT of the heart 2 years ago with a calcium score of 4 and  normal echocardiogram  Case was discussed with Dr. So   EKG is normal sinus at 67 Q waves in lead V2 V3 and lead III for set of troponin negative

## 2024-04-19 NOTE — ED ADULT NURSE NOTE - NSFALLUNIVINTERV_ED_ALL_ED
Bed/Stretcher in lowest position, wheels locked, appropriate side rails in place/Call bell, personal items and telephone in reach/Instruct patient to call for assistance before getting out of bed/chair/stretcher/Non-slip footwear applied when patient is off stretcher/[place] to call system/Physically safe environment - no spills, clutter or unnecessary equipment/Purposeful proactive rounding/Room/bathroom lighting operational, light cord in reach

## 2024-04-19 NOTE — H&P ADULT - HISTORY OF PRESENT ILLNESS
64 year old female with past medical history of HTN, HLD, breast cancer s/p radiation/lumpectomy w/ recurrence s/p bilateral mastectomy and reconstruction (2010), right knee OA, who presents with episode of chest pressure.  Patient states that she was at work and after walking up one flight of stairs she developed mid sternal chest tightness/pressure associated with burning sensation in her jaws. No associated LH/SOB/palpitation. No alleviating or exacerbating factors. Symptoms lasted 5-6 minutes and resolved on its own.  She states that she had similar symptoms in the past and saw Dr. Hinojosa in office in 2022. She had CT done with total Agatston coronary artery calcium score of 4.     In the ED, she was afebrile, /133 on presentation.  Labs are otherwise unremarkable, trops negative x 2, BNP 79  CXR personally reviewed showed no focal consolidation/effusion, pending official read  EKG personally reviewed showed NSR, possible anteroseptal infarct that is unchanged compared to previous EKG    She is being admitted for further management    64 year old female with past medical history of HTN, HLD, prediabetes, breast cancer s/p radiation/lumpectomy w/ recurrence s/p bilateral mastectomy and reconstruction (2010), right knee OA, who presents with episode of chest pressure.  Patient states that she was at work and after walking up one flight of stairs she developed mid sternal chest tightness/pressure associated with burning sensation in her jaws. No associated LH/SOB/palpitation. No alleviating or exacerbating factors. Symptoms lasted 5-6 minutes and resolved on its own. No symptoms currently.  She states that she had similar symptoms in the past and saw Dr. Hinojosa in office in 2022. She had CT done with total Agatston coronary artery calcium score of 4.     In the ED, she was afebrile, /133 on presentation.  Labs are otherwise unremarkable, trops negative x 2, BNP 79  CXR personally reviewed showed no focal consolidation/effusion, pending official read  EKG personally reviewed showed NSR, possible anteroseptal infarct that is unchanged compared to previous EKG    She is being admitted for further management

## 2024-04-19 NOTE — H&P ADULT - NSHPLABSRESULTS_GEN_ALL_CORE
LABS:                        13.5   8.32  )-----------( 290      ( 19 Apr 2024 10:55 )             40.8     04-19    138  |  105  |  17  ----------------------------<  90  4.6   |  26  |  0.50    Ca    9.3      19 Apr 2024 10:55    TPro  7.3  /  Alb  3.8  /  TBili  0.7  /  DBili  x   /  AST  27  /  ALT  26  /  AlkPhos  57  04-19    PT/INR - ( 19 Apr 2024 10:55 )   PT: 11.0 sec;   INR: 0.94 ratio           Urinalysis Basic - ( 19 Apr 2024 10:55 )    Color: x / Appearance: x / SG: x / pH: x  Gluc: 90 mg/dL / Ketone: x  / Bili: x / Urobili: x   Blood: x / Protein: x / Nitrite: x   Leuk Esterase: x / RBC: x / WBC x   Sq Epi: x / Non Sq Epi: x / Bacteria: x     CAPILLARY BLOOD GLUCOSE      RADIOLOGY & ADDITIONAL TESTS:    CT Heart Calcium Score 6/2022  IMPRESSION:    1.  The total Agatston coronary artery calcium score is 4, which   corresponds to the 58 percentile.

## 2024-04-19 NOTE — H&P ADULT - NSICDXPASTSURGICALHX_GEN_ALL_CORE_FT
PAST SURGICAL HISTORY:  H/O bilateral mastectomy     S/P Breast Lumpectomy right side 11/08, reexcision, 12/08 right sentinal node biopsy  left side 11/09    S/P Ovarian Cystectomy laparoscopic, in the 1990's    S/P Tonsillectomy age [age]

## 2024-04-19 NOTE — H&P ADULT - ASSESSMENT
64 year old female with past medical history of HTN, HLD, breast cancer s/p radiation/lumpectomy w/ recurrence s/p bilateral mastectomy and reconstruction (2010), right knee OA, who presents with episode of chest pressure.    Episode of Exertional Chest Pressure  -Ruled out for ACS with serial troponins x 2, EKG personally reviewed showed NSR, possible anteroseptal infarct that is unchanged compared to previous EKG  -CXR personally reviewed showed no focal consolidation/effusion, pending official read  -  -She does have multiple risk factors 64 year old female with past medical history of HTN, HLD, prediabetes breast cancer s/p radiation/lumpectomy w/ recurrence s/p bilateral mastectomy and reconstruction (2010), right knee OA, who presents with episode of chest pressure.    Episode of Exertional Chest Pressure  -Ruled out for ACS with serial troponins x 2, EKG personally reviewed showed NSR, possible anteroseptal infarct that is unchanged compared to previous EKG  -CXR personally reviewed showed no focal consolidation/effusion, pending official read  -CT Heart Total Agatston coronary artery calcium score of 4 in 6/2022  -She does have multiple risk factors for CAD  -Check lipid panel and HbA1C  -Check Echo  -Telemetry monitoring  -Cardiology consult - discussed with team    Hypertensive Urgency  -/133 on presentation, anxiety might've contributed, repeat /79  -Continue losartan  -Monitor BP, uptitrate losartan as needed     HLD  -Follow up lipid panel  -Continue atorvastatin    Prediabetes  -Check HbA1C as above  -Dietary and lifestyle modification  -Outpatient follow up with PMD for monitoring     History of Breast Cancer s/p Bilateral Mastectomy (2010)  -Outpatient follow up    DVT PPx  -Lovenox SC     Plan of care discussed with patient at length, all questions were answered   Discussed with Dr. Smith and cardiology team

## 2024-04-19 NOTE — PATIENT PROFILE ADULT - FALL HARM RISK - HARM RISK INTERVENTIONS

## 2024-04-19 NOTE — CONSULT NOTE ADULT - ASSESSMENT
Assessment: 64-year-old female history of hypertension, hyperlipidemia, prediabetes, and breast CVA status post radiation/lumpectomy with bilateral mastectomy with reconstruction admitted with chest pressure.  Patient states that she walked up 1 flight of stairs and began to feel midsternal chest pressure radiating to her jaw.  Denies any shortness of breath, lightheadedness or dizziness    Recommendations  EKG sinus rhythm, no acute ischemia. troponin negative x 2, doubt ACS  Check TTE   continue to monitor on telemetry   may consider stress test Inpatient versus outpatient     [de-identified] : 42-year-old male with no significant past medical history comes in for an annual physical exam.  Overall, feels well denies any chest pain shortness of breath PND orthopnea unintentional weight gain or weight loss.

## 2024-04-20 ENCOUNTER — TRANSCRIPTION ENCOUNTER (OUTPATIENT)
Age: 64
End: 2024-04-20

## 2024-04-20 VITALS
DIASTOLIC BLOOD PRESSURE: 76 MMHG | OXYGEN SATURATION: 97 % | HEART RATE: 75 BPM | SYSTOLIC BLOOD PRESSURE: 119 MMHG | TEMPERATURE: 98 F | RESPIRATION RATE: 17 BRPM

## 2024-04-20 LAB
A1C WITH ESTIMATED AVERAGE GLUCOSE RESULT: 5.4 % — SIGNIFICANT CHANGE UP (ref 4–5.6)
ANION GAP SERPL CALC-SCNC: 6 MMOL/L — SIGNIFICANT CHANGE UP (ref 5–17)
BUN SERPL-MCNC: 12 MG/DL — SIGNIFICANT CHANGE UP (ref 7–23)
CALCIUM SERPL-MCNC: 8.9 MG/DL — SIGNIFICANT CHANGE UP (ref 8.4–10.5)
CHLORIDE SERPL-SCNC: 107 MMOL/L — SIGNIFICANT CHANGE UP (ref 96–108)
CHOLEST SERPL-MCNC: 169 MG/DL — SIGNIFICANT CHANGE UP
CO2 SERPL-SCNC: 29 MMOL/L — SIGNIFICANT CHANGE UP (ref 22–31)
CREAT SERPL-MCNC: 0.75 MG/DL — SIGNIFICANT CHANGE UP (ref 0.5–1.3)
EGFR: 88 ML/MIN/1.73M2 — SIGNIFICANT CHANGE UP
ESTIMATED AVERAGE GLUCOSE: 108 MG/DL — SIGNIFICANT CHANGE UP (ref 68–114)
GLUCOSE SERPL-MCNC: 92 MG/DL — SIGNIFICANT CHANGE UP (ref 70–99)
HCT VFR BLD CALC: 41.2 % — SIGNIFICANT CHANGE UP (ref 34.5–45)
HCV AB S/CO SERPL IA: 0.05 S/CO — SIGNIFICANT CHANGE UP (ref 0–0.99)
HCV AB SERPL-IMP: SIGNIFICANT CHANGE UP
HDLC SERPL-MCNC: 58 MG/DL — SIGNIFICANT CHANGE UP
HGB BLD-MCNC: 13.6 G/DL — SIGNIFICANT CHANGE UP (ref 11.5–15.5)
LIPID PNL WITH DIRECT LDL SERPL: 97 MG/DL — SIGNIFICANT CHANGE UP
MCHC RBC-ENTMCNC: 30.6 PG — SIGNIFICANT CHANGE UP (ref 27–34)
MCHC RBC-ENTMCNC: 33 GM/DL — SIGNIFICANT CHANGE UP (ref 32–36)
MCV RBC AUTO: 92.6 FL — SIGNIFICANT CHANGE UP (ref 80–100)
NON HDL CHOLESTEROL: 110 MG/DL — SIGNIFICANT CHANGE UP
NRBC # BLD: 0 /100 WBCS — SIGNIFICANT CHANGE UP (ref 0–0)
PLATELET # BLD AUTO: 278 K/UL — SIGNIFICANT CHANGE UP (ref 150–400)
POTASSIUM SERPL-MCNC: 4.2 MMOL/L — SIGNIFICANT CHANGE UP (ref 3.5–5.3)
POTASSIUM SERPL-SCNC: 4.2 MMOL/L — SIGNIFICANT CHANGE UP (ref 3.5–5.3)
RBC # BLD: 4.45 M/UL — SIGNIFICANT CHANGE UP (ref 3.8–5.2)
RBC # FLD: 13.2 % — SIGNIFICANT CHANGE UP (ref 10.3–14.5)
SODIUM SERPL-SCNC: 142 MMOL/L — SIGNIFICANT CHANGE UP (ref 135–145)
TRIGL SERPL-MCNC: 72 MG/DL — SIGNIFICANT CHANGE UP
TROPONIN I, HIGH SENSITIVITY RESULT: <4 NG/L — SIGNIFICANT CHANGE UP
WBC # BLD: 6.19 K/UL — SIGNIFICANT CHANGE UP (ref 3.8–10.5)
WBC # FLD AUTO: 6.19 K/UL — SIGNIFICANT CHANGE UP (ref 3.8–10.5)

## 2024-04-20 PROCEDURE — 36415 COLL VENOUS BLD VENIPUNCTURE: CPT

## 2024-04-20 PROCEDURE — 99239 HOSP IP/OBS DSCHRG MGMT >30: CPT

## 2024-04-20 PROCEDURE — 83036 HEMOGLOBIN GLYCOSYLATED A1C: CPT

## 2024-04-20 PROCEDURE — 80061 LIPID PANEL: CPT

## 2024-04-20 PROCEDURE — 80048 BASIC METABOLIC PNL TOTAL CA: CPT

## 2024-04-20 PROCEDURE — 99285 EMERGENCY DEPT VISIT HI MDM: CPT | Mod: 25

## 2024-04-20 PROCEDURE — 93010 ELECTROCARDIOGRAM REPORT: CPT

## 2024-04-20 PROCEDURE — 86803 HEPATITIS C AB TEST: CPT

## 2024-04-20 PROCEDURE — 71045 X-RAY EXAM CHEST 1 VIEW: CPT

## 2024-04-20 PROCEDURE — 85025 COMPLETE CBC W/AUTO DIFF WBC: CPT

## 2024-04-20 PROCEDURE — 80053 COMPREHEN METABOLIC PANEL: CPT

## 2024-04-20 PROCEDURE — C8929: CPT

## 2024-04-20 PROCEDURE — 84484 ASSAY OF TROPONIN QUANT: CPT

## 2024-04-20 PROCEDURE — 85610 PROTHROMBIN TIME: CPT

## 2024-04-20 PROCEDURE — 85027 COMPLETE CBC AUTOMATED: CPT

## 2024-04-20 PROCEDURE — 83880 ASSAY OF NATRIURETIC PEPTIDE: CPT

## 2024-04-20 PROCEDURE — 93005 ELECTROCARDIOGRAM TRACING: CPT

## 2024-04-20 PROCEDURE — G0378: CPT

## 2024-04-20 RX ORDER — ATORVASTATIN CALCIUM 80 MG/1
1 TABLET, FILM COATED ORAL
Qty: 0 | Refills: 0 | DISCHARGE

## 2024-04-20 RX ADMIN — ENOXAPARIN SODIUM 40 MILLIGRAM(S): 100 INJECTION SUBCUTANEOUS at 06:58

## 2024-04-20 RX ADMIN — LOSARTAN POTASSIUM 50 MILLIGRAM(S): 100 TABLET, FILM COATED ORAL at 06:58

## 2024-04-20 NOTE — DISCHARGE NOTE PROVIDER - CARE PROVIDER_API CALL
Nishant Hinojosa  Cardiology  [address], [address]  [place] [address]  Phone: [phone]  Fax: [phone]  Follow Up Time:     Mahin Pascual  [address] [address]  Phone: [phone]  Fax: [phone]  Follow Up Time:

## 2024-04-20 NOTE — DISCHARGE NOTE NURSING/CASE MANAGEMENT/SOCIAL WORK - PATIENT PORTAL LINK FT
You can access the FollowMyHealth Patient Portal offered by [facility] by registering at the following website: [email or web address]. By joining [facility]’s FollowMyHealth portal, you will also be able to view your health information using other applications (apps) compatible with our system.

## 2024-04-20 NOTE — DISCHARGE NOTE NURSING/CASE MANAGEMENT/SOCIAL WORK - NSDCPEFALRISK_GEN_ALL_CORE
For information on Fall & Injury Prevention, visit: [email or web address] OR  [email or web address] OR  https://www.cdc.gov/steadi/patient.html

## 2024-04-20 NOTE — DISCHARGE NOTE PROVIDER - HOSPITAL COURSE
64 year old female with past medical history of HTN, HLD, prediabetes breast cancer s/p radiation/lumpectomy w/ recurrence s/p bilateral mastectomy and reconstruction (2010), right knee OA, who presents with episode of chest pressure. symptoms resolved now. troponin negative x3. EKG showed NSR, no acute ST/T changes. echo showed preserved EF, grade I DD. pt seen by cardiology. ACS ruled out. pt is stable for discharge.      discharge provider: Min Swenson [phone] please call with any questions     PMD Dr. Pascual- notified.

## 2024-04-20 NOTE — DISCHARGE NOTE PROVIDER - NSDCCPCAREPLAN_GEN_ALL_CORE_FT
PRINCIPAL DISCHARGE DIAGNOSIS  Diagnosis: Acute chest pain  Assessment and Plan of Treatment: Acute coronary syndrome ruled out. please follow up with Cardiology as outpatient.

## 2024-04-20 NOTE — DISCHARGE NOTE PROVIDER - CARE PROVIDERS DIRECT ADDRESSES
The patient has been re-examined and I agree with the above assessment or I updated with my findings.
[email or web address]

## 2024-04-20 NOTE — DISCHARGE NOTE PROVIDER - NSDCMRMEDTOKEN_GEN_ALL_CORE_FT
atorvastatin 40 mg oral tablet: 1 tab(s) orally once a day (at bedtime)  losartan 50 mg oral tablet: 1 tab(s) orally once a day

## 2024-04-24 ENCOUNTER — NON-APPOINTMENT (OUTPATIENT)
Age: 64
End: 2024-04-24

## 2024-04-26 ENCOUNTER — APPOINTMENT (OUTPATIENT)
Dept: FAMILY MEDICINE | Facility: CLINIC | Age: 64
End: 2024-04-26
Payer: COMMERCIAL

## 2024-04-26 VITALS
BODY MASS INDEX: 26.16 KG/M2 | RESPIRATION RATE: 20 BRPM | HEIGHT: 65 IN | DIASTOLIC BLOOD PRESSURE: 80 MMHG | SYSTOLIC BLOOD PRESSURE: 132 MMHG | HEART RATE: 76 BPM | WEIGHT: 157 LBS

## 2024-04-26 DIAGNOSIS — R07.9 CHEST PAIN, UNSPECIFIED: ICD-10-CM

## 2024-04-26 PROCEDURE — 99496 TRANSJ CARE MGMT HIGH F2F 7D: CPT

## 2024-04-26 NOTE — HISTORY OF PRESENT ILLNESS
[Post-hospitalization from ___ Hospital] : Post-hospitalization from [unfilled] Hospital [Admitted on: ___] : The patient was admitted on [unfilled] [Discharged on ___] : discharged on [unfilled] [Discharge Summary] : discharge summary [Pertinent Labs] : pertinent labs [Radiology Findings] : radiology findings [Discharge Med List] : discharge medication list [Other: ____] : [unfilled] [Med Reconciliation] : medication reconciliation has been completed [Patient Contacted By: ____] : and contacted by [unfilled] [FreeTextEntry2] : Presented to the ER after experiencing chest pressure and "flushing" at work - EMS called by coworkers.  BP was also noted to be elevated at that time.  Cardiac workup was negative for an acute event and patient was felt to be stable for discharge for further workup as an outpatient - scheduled.  Pt states feeling well at this encounter. Stage 1 Add-On Histology Text: SCC

## 2024-04-27 LAB
CK SERPL-CCNC: 110 U/L
FOLATE SERPL-MCNC: 7.8 NG/ML
T4 FREE SERPL-MCNC: 1.3 NG/DL
TSH SERPL-ACNC: 3.74 UIU/ML
VIT B12 SERPL-MCNC: 307 PG/ML

## 2024-04-30 ENCOUNTER — APPOINTMENT (OUTPATIENT)
Dept: CARDIOLOGY | Facility: CLINIC | Age: 64
End: 2024-04-30
Payer: COMMERCIAL

## 2024-04-30 PROCEDURE — 93015 CV STRESS TEST SUPVJ I&R: CPT

## 2024-06-05 ENCOUNTER — APPOINTMENT (OUTPATIENT)
Dept: ORTHOPEDIC SURGERY | Facility: CLINIC | Age: 64
End: 2024-06-05
Payer: COMMERCIAL

## 2024-06-05 VITALS — BODY MASS INDEX: 26.16 KG/M2 | HEIGHT: 65 IN | WEIGHT: 157 LBS

## 2024-06-05 DIAGNOSIS — M17.11 UNILATERAL PRIMARY OSTEOARTHRITIS, RIGHT KNEE: ICD-10-CM

## 2024-06-05 PROCEDURE — 99214 OFFICE O/P EST MOD 30 MIN: CPT

## 2024-06-05 NOTE — ADDENDUM
[FreeTextEntry1] : I, DIANA LÓPEZ, acted solely as a scribe for Dr. Yoav Butler on this date 06/05/2024.  All medical record entries made by the Scribe were at my, Dr. Yoav Butlre, direction and personally dictated by me on 06/05/2024. I have reviewed the chart and agree that the record accurately reflects my personal performance of the history, physical exam, assessment and plan. I have also personally directed, reviewed, and agreed with the chart.

## 2024-06-05 NOTE — HISTORY OF PRESENT ILLNESS
[de-identified] : 64 year old female presents for a right knee MRI review today 6/5/2024. She nots her right knee is acting up again. She notes her right knee feels weak and feels likes it's going to give out. She notes the right knee has not given out. Patient states that she has difficulty when ascending and descending stairs. She notes the pain is not unbearable. I went over the patient's MRI results with them in great detail and used models to aid in my explanation. She had a right knee cortisone injection on 3/27/2024 and reports it worked the same night.  [facility] MRI RIGHT KNEE done on 4/17/2024 Impression:  Complex truncated tearing of the lateral meniscus and associated mild lateral compartment chondral wear.  Mild/moderate patellofemoral compartment chondral wear.  --- End of Report ---   Radiology Results: 3/27/2024 Right Knee: Standing AP, lateral, tunnel and skyline views were obtained and reveal mild tricompartmental osteoarthritis with mild patella miranda   IMPRESSION: X-rays bilateral knee done on 3/15/14 Unremarkable bilateral knees.  --- End of Report ---  She had x-rays of both knees done on 03/15/24 at [facility] at [place] revealed:

## 2024-06-05 NOTE — DISCUSSION/SUMMARY
[de-identified] : I went over the pathophysiology of the patient's symptoms in great detail with the patient. I went over the patient's MRI results with them in great detail and used models to aid in my explanation. I informed the patient that surgery, a right knee arthroscopy, will be required to provide them long term relief from their symptoms. Patient understands that she needs to consider an arthroscopy given conservative measures are not providing enough relief and due to the results of the MRI. We had a lengthy discussion about the patient's issues, and talked about the benefits and risks of the procedure. The patient understands the most common risks include infection, allergy to the anesthetic and deep vein thrombosis. The risks are accepted. The patient was given no assurances that all the symptoms will be alleviated. 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 I provided the names of Dr. Jones and Dr. Juárez. She should avoid deep squatting, twisting, pivoting and kneeling.   All of their questions were answered. They understand and consent to the plan.   FU with Dr. Jones or Dr. Juárez.

## 2024-06-05 NOTE — PHYSICAL EXAM
[de-identified] : Constitutional o Appearance : well-nourished, well developed, alert, in no acute distress  Head and Face o Head :  Inspection : atraumatic, normocephalic o Face :  Inspection : no visible rash or discoloration Respiratory o Respiratory Effort: breathing unlabored  Neurologic o Mental Status Examination :  Orientation : grossly oriented to person, place and time Psychiatric o Mood and Affect: mood normal, affect appropriate   Right Lower Extremity o Buttock : no tenderness, swelling or deformities  o Right Hip :  Inspection/Palpation : no tenderness, swelling or deformities  Range of Motion : full and painless in all planes, no crepitance  Stability : joint stability intact  Strength : extension, flexion, adduction, abduction, internal rotation and external rotation 5/5   o Right Knee :  Inspection/Palpation : lateral compartment tenderness to palpation, minimal warmth, minimal swelling, valgus deformity   Range of Motion : 5-130 full extension causes discomfort, no crepitance, decreased patellofemoral glide,   Stability : no valgus or varus instability present on provocative testing  Strength : flexion and extension 5/5  Tests and Signs : Anterior Drawer negative, Lachman negative, Aditi's positive   Left Lower Extremity o Buttock : no tenderness, swelling or deformities  o Left Hip :  Inspection/Palpation : no tenderness, no swelling or deformities  Range of Motion : full and painless in all planes, no crepitance  Stability : joint stability intact  Strength : extension, flexion, adduction, abduction, internal rotation and external rotation 5/5  o Left Knee :  Inspection/Palpation : no tenderness to palpation, no swelling, valgus deformity   Range of Motion : active flexion and extension full and painless, no crepitance, decreased patellofemoral glide   Stability : no valgus or varus instability present on provocative testing  Strength : flexion and extension 5/5  Tests and Signs : Anterior Drawer negative, Lachman negative, Aditi's negative  Gait: heel/toe , valgus deformity, no significant extremity swelling or lymphedema

## 2024-06-10 ENCOUNTER — APPOINTMENT (OUTPATIENT)
Dept: ORTHOPEDIC SURGERY | Facility: CLINIC | Age: 64
End: 2024-06-10
Payer: COMMERCIAL

## 2024-06-10 ENCOUNTER — NON-APPOINTMENT (OUTPATIENT)
Age: 64
End: 2024-06-10

## 2024-06-10 VITALS — HEIGHT: 65 IN | BODY MASS INDEX: 26.16 KG/M2 | WEIGHT: 157 LBS

## 2024-06-10 DIAGNOSIS — S83.281D OTHER TEAR OF LATERAL MENISCUS, CURRENT INJURY, RIGHT KNEE, SUBSEQUENT ENCOUNTER: ICD-10-CM

## 2024-06-10 PROCEDURE — 99214 OFFICE O/P EST MOD 30 MIN: CPT

## 2024-07-09 ENCOUNTER — NON-APPOINTMENT (OUTPATIENT)
Age: 64
End: 2024-07-09

## 2024-07-26 ENCOUNTER — APPOINTMENT (OUTPATIENT)
Dept: FAMILY MEDICINE | Facility: CLINIC | Age: 64
End: 2024-07-26
Payer: COMMERCIAL

## 2024-07-26 VITALS
WEIGHT: 160 LBS | SYSTOLIC BLOOD PRESSURE: 135 MMHG | TEMPERATURE: 97.5 F | DIASTOLIC BLOOD PRESSURE: 70 MMHG | BODY MASS INDEX: 26.66 KG/M2 | OXYGEN SATURATION: 99 % | HEIGHT: 65 IN | HEART RATE: 79 BPM | RESPIRATION RATE: 16 BRPM

## 2024-07-26 DIAGNOSIS — I10 ESSENTIAL (PRIMARY) HYPERTENSION: ICD-10-CM

## 2024-07-26 DIAGNOSIS — E53.8 DEFICIENCY OF OTHER SPECIFIED B GROUP VITAMINS: ICD-10-CM

## 2024-07-26 DIAGNOSIS — S83.281A OTHER TEAR OF LATERAL MENISCUS, CURRENT INJURY, RIGHT KNEE, INITIAL ENCOUNTER: ICD-10-CM

## 2024-07-26 DIAGNOSIS — I71.40 ABDOMINAL AORTIC ANEURYSM, WITHOUT RUPTURE, UNSPECIFIED: ICD-10-CM

## 2024-07-26 DIAGNOSIS — E78.5 HYPERLIPIDEMIA, UNSPECIFIED: ICD-10-CM

## 2024-07-26 DIAGNOSIS — Z01.818 ENCOUNTER FOR OTHER PREPROCEDURAL EXAMINATION: ICD-10-CM

## 2024-07-26 PROCEDURE — 99214 OFFICE O/P EST MOD 30 MIN: CPT

## 2024-07-26 NOTE — PHYSICAL EXAM
[No Acute Distress] : no acute distress [Well Nourished] : well nourished [EOMI] : extraocular movements intact [Clear to Auscultation] : lungs were clear to auscultation bilaterally [Normal S1, S2] : normal S1 and S2 [No Edema] : there was no peripheral edema [Non Tender] : non-tender [No Rash] : no rash [Normal Affect] : the affect was normal [de-identified] : right knee pain with rom [de-identified] : antalgic gait

## 2024-07-26 NOTE — RESULTS/DATA
[] : results reviewed [de-identified] : 07/24/2024: Results within acceptable range [de-identified] : 07/24/2024: Results within acceptable range [de-identified] : 07/04/2024: NSR 63 bpm no acute ST- T wave changes, pt asymptomatic

## 2024-07-26 NOTE — HISTORY OF PRESENT ILLNESS
[No Pertinent Cardiac History] : no history of aortic stenosis, atrial fibrillation, coronary artery disease, recent myocardial infarction, or implantable device/pacemaker [No Pertinent Pulmonary History] : no history of asthma, COPD, sleep apnea, or smoking [(Patient denies any chest pain, claudication, dyspnea on exertion, orthopnea, palpitations or syncope)] : Patient denies any chest pain, claudication, dyspnea on exertion, orthopnea, palpitations or syncope [Aortic Stenosis] : no aortic stenosis [Atrial Fibrillation] : no atrial fibrillation [Coronary Artery Disease] : no coronary artery disease [Recent Myocardial Infarction] : no recent myocardial infarction [Implantable Device/Pacemaker] : no implantable device/pacemaker [Asthma] : no asthma [COPD] : no COPD [Sleep Apnea] : no sleep apnea [Smoker] : not a smoker [Family Member] : no family member with adverse anesthesia reaction/sudden death [Self] : no previous adverse anesthesia reaction [Chronic Anticoagulation] : no chronic anticoagulation [Chronic Kidney Disease] : no chronic kidney disease [Diabetes] : no diabetes [Good (7-10 METs)] : Good (7-10 METs) [FreeTextEntry1] : right knee arthroscopic lateral meniscectomy  [FreeTextEntry2] : 08/06/2024 [FreeTextEntry3] : Dr. Jones [FreeTextEntry4] : Ms. Sadia Puga is a 63 yo female presents today for preop evaluation for a right knee arthroscopic lateral meniscectomy to be done by Dr. Jones on 08/06/2024 at [facility]. Pt has had instability and pain of her right knee since March 2024. Pt reports possibly twisted her knee awkwardly upon removing luggage form an overhead compartment on a plane. Today, pt denies any fever, chills, n/c/v/d.  [FreeTextEntry7] : EC2024: NSR 63 bpm no acute ST- T wave changes, pt asymptomatic

## 2024-07-26 NOTE — ASSESSMENT
[High Risk Surgery - Intraperitoneal, Intrathoracic or Supringuinal Vascular Procedures] : High Risk Surgery - Intraperitoneal, Intrathoracic or Supringuinal Vascular Procedures - No (0) [Ischemic Heart Disease] : Ischemic Heart Disease - No (0) [Congestive Heart Failure] : Congestive Heart Failure - No (0) [Prior Cerebrovascular Accident or TIA] : Prior Cerebrovascular Accident or TIA - No (0) [Creatinine >= 2mg/dL (1 Point)] : Creatinine >= 2mg/dL - No (0) [Insulin-dependent Diabetic (1 Point)] : Insulin-dependent Diabetic - No (0) [0] : 0 , RCRI Class: I, Risk of Post-Op Cardiac Complications: 3.9%, 95% CI for Risk Estimate: 2.8% - 5.4% [Patient Optimized for Surgery] : Patient optimized for surgery [As per surgery] : as per surgery [FreeTextEntry4] : Completed physical Exam, reviewed pre op labs, EKG.  Advised pt to avoid any NSAIDs, vitamins, aspirin for 7 days till post surgery.  Avoid any food or drinks past midnight, the day prior to surgery.  Pt may take rest of the medication with small sips of water the day of the surgery.  RTO for a follow up appointment post surgery.  RCRI Class I for surgery, Pt is currently optimized to proceed with surgery.

## 2024-08-05 ENCOUNTER — TRANSCRIPTION ENCOUNTER (OUTPATIENT)
Age: 64
End: 2024-08-05

## 2024-08-06 ENCOUNTER — APPOINTMENT (OUTPATIENT)
Dept: ORTHOPEDIC SURGERY | Facility: HOSPITAL | Age: 64
End: 2024-08-06

## 2024-08-06 ENCOUNTER — TRANSCRIPTION ENCOUNTER (OUTPATIENT)
Age: 64
End: 2024-08-06

## 2024-08-06 ENCOUNTER — OUTPATIENT (OUTPATIENT)
Dept: OUTPATIENT SERVICES | Facility: HOSPITAL | Age: 64
LOS: 1 days | End: 2024-08-06
Payer: COMMERCIAL

## 2024-08-06 VITALS
HEART RATE: 77 BPM | TEMPERATURE: 98 F | OXYGEN SATURATION: 97 % | DIASTOLIC BLOOD PRESSURE: 54 MMHG | RESPIRATION RATE: 12 BRPM | SYSTOLIC BLOOD PRESSURE: 122 MMHG

## 2024-08-06 VITALS
SYSTOLIC BLOOD PRESSURE: 147 MMHG | WEIGHT: 157.19 LBS | HEART RATE: 70 BPM | RESPIRATION RATE: 17 BRPM | TEMPERATURE: 99 F | OXYGEN SATURATION: 98 % | DIASTOLIC BLOOD PRESSURE: 87 MMHG | HEIGHT: 65 IN

## 2024-08-06 DIAGNOSIS — S83.281D OTHER TEAR OF LATERAL MENISCUS, CURRENT INJURY, RIGHT KNEE, SUBSEQUENT ENCOUNTER: ICD-10-CM

## 2024-08-06 DIAGNOSIS — Z01.818 ENCOUNTER FOR OTHER PREPROCEDURAL EXAMINATION: ICD-10-CM

## 2024-08-06 DIAGNOSIS — Z90.13 ACQUIRED ABSENCE OF BILATERAL BREASTS AND NIPPLES: Chronic | ICD-10-CM

## 2024-08-06 PROCEDURE — 29881 ARTHRS KNE SRG MNISECTMY M/L: CPT | Mod: RT

## 2024-08-06 PROCEDURE — 97161 PT EVAL LOW COMPLEX 20 MIN: CPT

## 2024-08-06 DEVICE — IMP SYS REPAIR MENISCAL ROOT: Type: IMPLANTABLE DEVICE | Status: FUNCTIONAL

## 2024-08-06 DEVICE — IMP TIGHTROPE ACL UHMWPE: Type: IMPLANTABLE DEVICE | Status: FUNCTIONAL

## 2024-08-06 DEVICE — IMP ABS TIGHTROPE: Type: IMPLANTABLE DEVICE | Status: FUNCTIONAL

## 2024-08-06 DEVICE — IMP SYS REPAIR MENISCAL ROOT W/PEEK SWIVELOCK: Type: IMPLANTABLE DEVICE | Status: FUNCTIONAL

## 2024-08-06 RX ORDER — CHLORHEXIDINE GLUCONATE 500 MG/1
1 CLOTH TOPICAL ONCE
Refills: 0 | Status: COMPLETED | OUTPATIENT
Start: 2024-08-06 | End: 2024-08-06

## 2024-08-06 RX ORDER — CEFAZOLIN SODIUM 10 G
2000 VIAL (EA) INJECTION ONCE
Refills: 0 | Status: COMPLETED | OUTPATIENT
Start: 2024-08-06 | End: 2024-08-06

## 2024-08-06 RX ORDER — ACETAMINOPHEN 500 MG
2 TABLET ORAL
Qty: 0 | Refills: 0 | DISCHARGE

## 2024-08-06 RX ORDER — OXYCODONE HYDROCHLORIDE 30 MG/1
5 TABLET ORAL ONCE
Refills: 0 | Status: DISCONTINUED | OUTPATIENT
Start: 2024-08-06 | End: 2024-08-06

## 2024-08-06 RX ORDER — HYDROMORPHONE HCL IN 0.9% NACL 0.2 MG/ML
0.5 PLASTIC BAG, INJECTION (ML) INTRAVENOUS
Refills: 0 | Status: DISCONTINUED | OUTPATIENT
Start: 2024-08-06 | End: 2024-08-06

## 2024-08-06 RX ORDER — ONDANSETRON HCL/PF 4 MG/2 ML
4 VIAL (ML) INJECTION ONCE
Refills: 0 | Status: COMPLETED | OUTPATIENT
Start: 2024-08-06 | End: 2024-08-06

## 2024-08-06 RX ORDER — DEXTROSE MONOHYDRATE, SODIUM CHLORIDE, SODIUM LACTATE, CALCIUM CHLORIDE, MAGNESIUM CHLORIDE 1.5; 538; 448; 18.4; 5.08 G/100ML; MG/100ML; MG/100ML; MG/100ML; MG/100ML
1000 SOLUTION INTRAPERITONEAL
Refills: 0 | Status: DISCONTINUED | OUTPATIENT
Start: 2024-08-06 | End: 2024-08-06

## 2024-08-06 RX ORDER — APREPITANT 40 MG
40 CAPSULE ORAL ONCE
Refills: 0 | Status: COMPLETED | OUTPATIENT
Start: 2024-08-06 | End: 2024-08-06

## 2024-08-06 RX ORDER — ACETAMINOPHEN 500 MG
1000 TABLET ORAL ONCE
Refills: 0 | Status: COMPLETED | OUTPATIENT
Start: 2024-08-06 | End: 2024-08-06

## 2024-08-06 RX ORDER — HYDROMORPHONE HCL IN 0.9% NACL 0.2 MG/ML
0.2 PLASTIC BAG, INJECTION (ML) INTRAVENOUS
Refills: 0 | Status: DISCONTINUED | OUTPATIENT
Start: 2024-08-06 | End: 2024-08-06

## 2024-08-06 RX ORDER — OXYCODONE HYDROCHLORIDE 30 MG/1
1 TABLET ORAL
Qty: 20 | Refills: 0
Start: 2024-08-06 | End: 2024-08-08

## 2024-08-06 RX ORDER — NAPROXEN 250 MG
1 TABLET ORAL
Qty: 28 | Refills: 0
Start: 2024-08-06 | End: 2024-08-19

## 2024-08-06 RX ADMIN — Medication 4 MILLIGRAM(S): at 09:57

## 2024-08-06 RX ADMIN — CHLORHEXIDINE GLUCONATE 1 APPLICATION(S): 500 CLOTH TOPICAL at 07:35

## 2024-08-06 RX ADMIN — Medication 40 MILLIGRAM(S): at 07:35

## 2024-08-06 NOTE — ASU DISCHARGE PLAN (ADULT/PEDIATRIC) - NS MD DC FALL RISK RISK
For information on Fall & Injury Prevention, visit: [email or web address] OR  [email or web address] OR  https://www.cdc.gov/steadi/patient.html

## 2024-08-06 NOTE — BRIEF OPERATIVE NOTE - NSICDXBRIEFPOSTOP_GEN_ALL_CORE_FT
POST-OP DIAGNOSIS:  Lateral meniscal tear 06-Aug-2024 09:28:26  Xuan Vazquez  Chondromalacia, right knee 06-Aug-2024 09:28:40  Xuan Vazquez

## 2024-08-06 NOTE — ASU PATIENT PROFILE, ADULT - ABILITY TO HEAR (WITH HEARING AID OR HEARING APPLIANCE IF NORMALLY USED):
<<-----Click on this checkbox to enter Post-Op Dx <<-----Click on this checkbox to enter Post-Op Dx <<-----Click on this checkbox to enter Post-Op Dx Adequate: hears normal conversation without difficulty

## 2024-08-06 NOTE — ASU PATIENT PROFILE, ADULT - PATIENT'S SEXUAL ORIENTATION
Heterosexual Positioning (Leave Blank If You Do Not Want): The patient was placed in a comfortable position exposing the surgical site.

## 2024-08-06 NOTE — ASU PATIENT PROFILE, ADULT - NSICDXPASTMEDICALHX_GEN_ALL_CORE_FT
PAST MEDICAL HISTORY:  Breast Cancer see HPI    High cholesterol     Hypertension     Migraine Headache     PONV (postoperative nausea and vomiting)     Tear of lateral meniscus of right knee

## 2024-08-06 NOTE — ASU DISCHARGE PLAN (ADULT/PEDIATRIC) - COMMENTS
Please call the office to make an appointment in about 12 days from day of surgery to remove the sutures from the right knee

## 2024-08-06 NOTE — PHYSICAL THERAPY INITIAL EVALUATION ADULT - PERTINENT HX OF CURRENT PROBLEM, REHAB EVAL
65 yo female reports right knee pain with swelling and instability since 3/2024.  Pain is chronic exacerbated by activity and rates 8/10 at worst.  She is scheduled for right knee arthroscopic lateral meniscectomy on 8/6/2024 @ [facility].

## 2024-08-06 NOTE — PHYSICAL THERAPY INITIAL EVALUATION ADULT - STANDING BALANCE: DYNAMIC, REHAB EVAL
[]Improved  Comments: Child worked well if given breaks/rewards.     PLAN  [x]Continue with current plan of care  []Medical [facility]  []Hold per patient request  []Change Treatment plan:  []Insurance hold  []Other     TIME   Time Treatment session was INITIATED 10:00 AM   Time Treatment session was STOPPED 10:30 AM   Timed Code Treatment Minutes 30       Electronically signed by: DES Turner, OTR/L              Date:11/17/2023
normal balance

## 2024-08-06 NOTE — ASU DISCHARGE PLAN (ADULT/PEDIATRIC) - CARE PROVIDER_API CALL
Lalo Jones  Orthopaedic Surgery  [address], [address]  [place] [address]  Phone: [phone]  Fax: [phone]  Established Patient  Follow Up Time: 2 weeks

## 2024-08-06 NOTE — BRIEF OPERATIVE NOTE - NSICDXBRIEFPROCEDURE_GEN_ALL_CORE_FT
PROCEDURES:  Arthroscopy, knee, with debridement 06-Aug-2024 09:26:17 partial lateral menisectomy Xuan Vazquez

## 2024-08-06 NOTE — ASU DISCHARGE PLAN (ADULT/PEDIATRIC) - ASU DC SPECIAL INSTRUCTIONSFT
Elevate and ice the right knee    For Constipation :   • Increase your water intake. Drink at least 8 glasses of water daily.  • Try adding fiber to your diet by eating fruits, vegetables and foods that are rich in grains.  • If you do experience constipation, you may take an over-the-counter stool softener/laxative such as Tory Colace, Senekot or  Milk of Magnesia. Elevate and ice the right knee to reduce the post operative inflammation.  Alternate the oxycodone and tylenol ES to get better pain control.  Keep the dressingdry for three days. Upon removing the dressing the portal holes are sutured and keep them dry with NO cream nor ointments over the incision sites.  Please read the pamphlet provided for the care of the right knee.    For Constipation :   • Increase your water intake. Drink at least 8 glasses of water daily.  • Try adding fiber to your diet by eating fruits, vegetables and foods that are rich in grains.  • If you do experience constipation, you may take an over-the-counter stool softener/laxative such as Tory Colace, Senekot or  Milk of Magnesia.

## 2024-08-07 PROBLEM — R11.2 NAUSEA WITH VOMITING, UNSPECIFIED: Chronic | Status: ACTIVE | Noted: 2024-07-24

## 2024-08-15 ENCOUNTER — APPOINTMENT (OUTPATIENT)
Dept: ORTHOPEDIC SURGERY | Facility: CLINIC | Age: 64
End: 2024-08-15
Payer: COMMERCIAL

## 2024-08-15 VITALS — WEIGHT: 157 LBS | BODY MASS INDEX: 26.16 KG/M2 | HEIGHT: 65 IN

## 2024-08-15 DIAGNOSIS — S83.281D OTHER TEAR OF LATERAL MENISCUS, CURRENT INJURY, RIGHT KNEE, SUBSEQUENT ENCOUNTER: ICD-10-CM

## 2024-08-15 PROBLEM — S83.281A OTHER TEAR OF LATERAL MENISCUS, CURRENT INJURY, RIGHT KNEE, INITIAL ENCOUNTER: Chronic | Status: ACTIVE | Noted: 2024-07-24

## 2024-08-15 PROCEDURE — 99024 POSTOP FOLLOW-UP VISIT: CPT

## 2024-08-16 ENCOUNTER — NON-APPOINTMENT (OUTPATIENT)
Age: 64
End: 2024-08-16

## 2024-09-16 ENCOUNTER — APPOINTMENT (OUTPATIENT)
Dept: ORTHOPEDIC SURGERY | Facility: CLINIC | Age: 64
End: 2024-09-16
Payer: COMMERCIAL

## 2024-09-16 VITALS — HEIGHT: 65 IN | BODY MASS INDEX: 26.49 KG/M2 | WEIGHT: 159 LBS

## 2024-09-16 DIAGNOSIS — S83.281A OTHER TEAR OF LATERAL MENISCUS, CURRENT INJURY, RIGHT KNEE, INITIAL ENCOUNTER: ICD-10-CM

## 2024-09-16 DIAGNOSIS — M17.11 UNILATERAL PRIMARY OSTEOARTHRITIS, RIGHT KNEE: ICD-10-CM

## 2024-09-16 PROCEDURE — 99024 POSTOP FOLLOW-UP VISIT: CPT

## 2024-12-17 ENCOUNTER — APPOINTMENT (OUTPATIENT)
Dept: FAMILY MEDICINE | Facility: CLINIC | Age: 64
End: 2024-12-17
Payer: COMMERCIAL

## 2024-12-17 VITALS
DIASTOLIC BLOOD PRESSURE: 95 MMHG | SYSTOLIC BLOOD PRESSURE: 145 MMHG | RESPIRATION RATE: 20 BRPM | HEART RATE: 76 BPM | BODY MASS INDEX: 26.66 KG/M2 | WEIGHT: 160 LBS | HEIGHT: 65 IN

## 2024-12-17 DIAGNOSIS — I10 ESSENTIAL (PRIMARY) HYPERTENSION: ICD-10-CM

## 2024-12-17 DIAGNOSIS — E78.5 HYPERLIPIDEMIA, UNSPECIFIED: ICD-10-CM

## 2024-12-17 DIAGNOSIS — M12.9 ARTHROPATHY, UNSPECIFIED: ICD-10-CM

## 2024-12-17 PROCEDURE — 99214 OFFICE O/P EST MOD 30 MIN: CPT

## 2024-12-17 PROCEDURE — 36415 COLL VENOUS BLD VENIPUNCTURE: CPT

## 2024-12-17 RX ORDER — HYDROCHLOROTHIAZIDE 12.5 MG/1
12.5 CAPSULE ORAL
Qty: 90 | Refills: 3 | Status: ACTIVE | COMMUNITY
Start: 2024-12-17 | End: 1900-01-01

## 2024-12-19 LAB
25(OH)D3 SERPL-MCNC: 24.7 NG/ML
ALBUMIN SERPL ELPH-MCNC: 4.7 G/DL
ALP BLD-CCNC: 71 U/L
ALT SERPL-CCNC: 19 U/L
ANION GAP SERPL CALC-SCNC: 14 MMOL/L
AST SERPL-CCNC: 19 U/L
BILIRUB SERPL-MCNC: 0.3 MG/DL
BUN SERPL-MCNC: 13 MG/DL
CALCIUM SERPL-MCNC: 9.4 MG/DL
CHLORIDE SERPL-SCNC: 105 MMOL/L
CHOLEST SERPL-MCNC: 186 MG/DL
CO2 SERPL-SCNC: 20 MMOL/L
CREAT SERPL-MCNC: 0.67 MG/DL
CRP SERPL-MCNC: <3 MG/L
EGFR: 96 ML/MIN/1.73M2
ERYTHROCYTE [SEDIMENTATION RATE] IN BLOOD BY WESTERGREN METHOD: 11 MM/HR
ESTIMATED AVERAGE GLUCOSE: 108 MG/DL
FOLATE SERPL-MCNC: 11.1 NG/ML
GLUCOSE SERPL-MCNC: 92 MG/DL
HBA1C MFR BLD HPLC: 5.4 %
HCT VFR BLD CALC: 43.6 %
HDLC SERPL-MCNC: 56 MG/DL
HGB BLD-MCNC: 13.4 G/DL
LDLC SERPL CALC-MCNC: 107 MG/DL
MCHC RBC-ENTMCNC: 30 PG
MCHC RBC-ENTMCNC: 30.7 G/DL
MCV RBC AUTO: 97.8 FL
NONHDLC SERPL-MCNC: 130 MG/DL
PLATELET # BLD AUTO: 312 K/UL
POTASSIUM SERPL-SCNC: 4.2 MMOL/L
PROT SERPL-MCNC: 7.1 G/DL
RBC # BLD: 4.46 M/UL
RBC # FLD: 13.7 %
SODIUM SERPL-SCNC: 139 MMOL/L
T4 FREE SERPL-MCNC: 1 NG/DL
TRIGL SERPL-MCNC: 133 MG/DL
TSH SERPL-ACNC: 3.4 UIU/ML
VIT B12 SERPL-MCNC: 305 PG/ML
WBC # FLD AUTO: 8.4 K/UL

## 2025-01-13 ENCOUNTER — RX RENEWAL (OUTPATIENT)
Age: 65
End: 2025-01-13

## 2025-01-14 ENCOUNTER — APPOINTMENT (OUTPATIENT)
Dept: FAMILY MEDICINE | Facility: CLINIC | Age: 65
End: 2025-01-14
Payer: COMMERCIAL

## 2025-01-14 VITALS
BODY MASS INDEX: 26.66 KG/M2 | RESPIRATION RATE: 20 BRPM | DIASTOLIC BLOOD PRESSURE: 80 MMHG | HEIGHT: 65 IN | HEART RATE: 76 BPM | SYSTOLIC BLOOD PRESSURE: 125 MMHG | WEIGHT: 160 LBS

## 2025-01-14 DIAGNOSIS — I10 ESSENTIAL (PRIMARY) HYPERTENSION: ICD-10-CM

## 2025-01-14 PROCEDURE — 99213 OFFICE O/P EST LOW 20 MIN: CPT

## 2025-04-08 ENCOUNTER — NON-APPOINTMENT (OUTPATIENT)
Age: 65
End: 2025-04-08

## 2025-04-10 ENCOUNTER — APPOINTMENT (OUTPATIENT)
Dept: FAMILY MEDICINE | Facility: CLINIC | Age: 65
End: 2025-04-10
Payer: COMMERCIAL

## 2025-04-10 VITALS — HEART RATE: 76 BPM | RESPIRATION RATE: 20 BRPM | DIASTOLIC BLOOD PRESSURE: 75 MMHG | SYSTOLIC BLOOD PRESSURE: 130 MMHG

## 2025-04-10 DIAGNOSIS — E78.5 HYPERLIPIDEMIA, UNSPECIFIED: ICD-10-CM

## 2025-04-10 DIAGNOSIS — I10 ESSENTIAL (PRIMARY) HYPERTENSION: ICD-10-CM

## 2025-04-10 PROCEDURE — 99214 OFFICE O/P EST MOD 30 MIN: CPT

## 2025-04-10 PROCEDURE — G2211 COMPLEX E/M VISIT ADD ON: CPT | Mod: NC

## 2025-04-10 PROCEDURE — 36415 COLL VENOUS BLD VENIPUNCTURE: CPT

## 2025-04-11 LAB
ALBUMIN SERPL ELPH-MCNC: 4.3 G/DL
ALP BLD-CCNC: 65 U/L
ALT SERPL-CCNC: 21 U/L
ANION GAP SERPL CALC-SCNC: 12 MMOL/L
AST SERPL-CCNC: 17 U/L
BILIRUB SERPL-MCNC: 0.3 MG/DL
BUN SERPL-MCNC: 15 MG/DL
CALCIUM SERPL-MCNC: 9.3 MG/DL
CHLORIDE SERPL-SCNC: 107 MMOL/L
CHOLEST SERPL-MCNC: 182 MG/DL
CO2 SERPL-SCNC: 22 MMOL/L
CREAT SERPL-MCNC: 0.66 MG/DL
EGFRCR SERPLBLD CKD-EPI 2021: 96 ML/MIN/1.73M2
GLUCOSE SERPL-MCNC: 83 MG/DL
HDLC SERPL-MCNC: 50 MG/DL
LDLC SERPL-MCNC: 103 MG/DL
NONHDLC SERPL-MCNC: 132 MG/DL
POTASSIUM SERPL-SCNC: 4.4 MMOL/L
PROT SERPL-MCNC: 6.8 G/DL
SODIUM SERPL-SCNC: 141 MMOL/L
TRIGL SERPL-MCNC: 165 MG/DL

## 2025-07-10 NOTE — H&P ADULT - HIV OFFER
[facility] provides services at a reduced cost to those who are determined to be eligible through [facility]’s financial assistance program. Information regarding [facility]’s financial assistance program can be found by going to [email or web address] or by calling [phone].
Opt out

## 2025-08-04 ENCOUNTER — APPOINTMENT (OUTPATIENT)
Dept: FAMILY MEDICINE | Facility: CLINIC | Age: 65
End: 2025-08-04
Payer: COMMERCIAL

## 2025-08-04 VITALS
WEIGHT: 157 LBS | BODY MASS INDEX: 26.16 KG/M2 | HEART RATE: 76 BPM | RESPIRATION RATE: 20 BRPM | DIASTOLIC BLOOD PRESSURE: 78 MMHG | SYSTOLIC BLOOD PRESSURE: 125 MMHG | HEIGHT: 65 IN

## 2025-08-04 DIAGNOSIS — I10 ESSENTIAL (PRIMARY) HYPERTENSION: ICD-10-CM

## 2025-08-04 DIAGNOSIS — E78.5 HYPERLIPIDEMIA, UNSPECIFIED: ICD-10-CM

## 2025-08-04 PROCEDURE — 99214 OFFICE O/P EST MOD 30 MIN: CPT

## 2025-08-04 PROCEDURE — 36415 COLL VENOUS BLD VENIPUNCTURE: CPT

## 2025-08-04 PROCEDURE — G2211 COMPLEX E/M VISIT ADD ON: CPT | Mod: NC

## 2025-08-05 LAB
25(OH)D3 SERPL-MCNC: 27.9 NG/ML
ALBUMIN SERPL ELPH-MCNC: 4.6 G/DL
ALP BLD-CCNC: 78 U/L
ALT SERPL-CCNC: 26 U/L
ANION GAP SERPL CALC-SCNC: 14 MMOL/L
AST SERPL-CCNC: 29 U/L
BILIRUB SERPL-MCNC: 0.4 MG/DL
BUN SERPL-MCNC: 19 MG/DL
CALCIUM SERPL-MCNC: 10 MG/DL
CHLORIDE SERPL-SCNC: 104 MMOL/L
CHOLEST SERPL-MCNC: 208 MG/DL
CO2 SERPL-SCNC: 23 MMOL/L
CREAT SERPL-MCNC: 0.76 MG/DL
EGFRCR SERPLBLD CKD-EPI 2021: 86 ML/MIN/1.73M2
FOLATE SERPL-MCNC: 6.2 NG/ML
GLUCOSE SERPL-MCNC: 95 MG/DL
HCT VFR BLD CALC: 42.9 %
HDLC SERPL-MCNC: 55 MG/DL
HGB BLD-MCNC: 13.7 G/DL
LDLC SERPL-MCNC: 110 MG/DL
MCHC RBC-ENTMCNC: 29.7 PG
MCHC RBC-ENTMCNC: 31.9 G/DL
MCV RBC AUTO: 93.1 FL
NONHDLC SERPL-MCNC: 153 MG/DL
PLATELET # BLD AUTO: 338 K/UL
POTASSIUM SERPL-SCNC: 4.9 MMOL/L
PROT SERPL-MCNC: 7.2 G/DL
RBC # BLD: 4.61 M/UL
RBC # FLD: 13.9 %
SODIUM SERPL-SCNC: 141 MMOL/L
T4 FREE SERPL-MCNC: 1 NG/DL
TRIGL SERPL-MCNC: 254 MG/DL
TSH SERPL-ACNC: 2.75 UIU/ML
VIT B12 SERPL-MCNC: 254 PG/ML
WBC # FLD AUTO: 10.36 K/UL

## (undated) DEVICE — SYM-ARTHROSCOPY SHAVER: Type: DURABLE MEDICAL EQUIPMENT

## (undated) DEVICE — SOL IRR BAG NS 0.9% 3000ML

## (undated) DEVICE — SUT TIGERSTICK TIGERWIRE NUMBER 2

## (undated) DEVICE — NDL HYPO SAFE 22G X 1.5" (BLACK)

## (undated) DEVICE — TUBING SET GRAVITY 4 SPIKE

## (undated) DEVICE — DRAPE TOWEL BLUE 17" X 24"

## (undated) DEVICE — ELCTR GROUNDING PAD ADULT COVIDIEN

## (undated) DEVICE — LAP PAD W RING 18 X 18"

## (undated) DEVICE — PACK KNEE ARTHROSCOPY

## (undated) DEVICE — POSITIONER PATIENT SAFETY STRAP 3X60"

## (undated) DEVICE — GLV 8 PROTEXIS (WHITE)

## (undated) DEVICE — ELCTR STRYKER NEPTUNE SMOKE EVACUATION PENCIL (GREEN)

## (undated) DEVICE — SHAVER BLADE LINVATEC ULTRAFRR 3.5MM

## (undated) DEVICE — WARMING BLANKET UPPER ADULT

## (undated) DEVICE — DVC SUCTION FLR PUDDLE GUPPY

## (undated) DEVICE — S&N ARTHROCARE WAND COBLATION WEREWOLF FLOW 90

## (undated) DEVICE — VENODYNE/SCD SLEEVE CALF MEDIUM

## (undated) DEVICE — SYR LUER LOK 10CC

## (undated) DEVICE — ELCTR BUTTON SWITCH W EDGE COATED BLADE 3MM

## (undated) DEVICE — SUT MONOSOF 4-0 18" C-13

## (undated) DEVICE — NDL SPINAL 18G X 3.5" (PINK)

## (undated) DEVICE — DRAPE 3/4 SHEET 52X76"

## (undated) DEVICE — POSITIONER STIRRUP STRAP W SLIP RING 19X3.5"

## (undated) DEVICE — SOLIDIFIER CANN EXPRESS 3K

## (undated) DEVICE — BLADE SCALPEL SAFETYLOCK #15

## (undated) DEVICE — ARTHREX SCORPION NEEDLE KNEE

## (undated) DEVICE — S&N ARTHROCARE WAND COBLATION WEREWOLF FLOW 50

## (undated) DEVICE — POSITIONER STRAP ARMBOARD VELCRO TS-30

## (undated) DEVICE — ELCTR BOVIE TIP BLADE INSULATED 2.75" EDGE